# Patient Record
Sex: FEMALE | Race: OTHER | Employment: FULL TIME | URBAN - METROPOLITAN AREA
[De-identification: names, ages, dates, MRNs, and addresses within clinical notes are randomized per-mention and may not be internally consistent; named-entity substitution may affect disease eponyms.]

---

## 2018-05-05 ENCOUNTER — HOSPITAL ENCOUNTER (EMERGENCY)
Age: 53
Discharge: HOME | End: 2018-05-05
Attending: EMERGENCY MEDICINE
Payer: COMMERCIAL

## 2018-05-05 ENCOUNTER — APPOINTMENT (OUTPATIENT)
Dept: GENERAL RADIOLOGY | Age: 53
End: 2018-05-05
Attending: EMERGENCY MEDICINE
Payer: COMMERCIAL

## 2018-05-05 VITALS
DIASTOLIC BLOOD PRESSURE: 83 MMHG | OXYGEN SATURATION: 98 % | TEMPERATURE: 98.6 F | SYSTOLIC BLOOD PRESSURE: 130 MMHG | HEART RATE: 87 BPM | RESPIRATION RATE: 28 BRPM

## 2018-05-05 DIAGNOSIS — J20.9 BRONCHOSPASM WITH BRONCHITIS, ACUTE: Primary | ICD-10-CM

## 2018-05-05 PROCEDURE — 71045 X-RAY EXAM CHEST 1 VIEW: CPT

## 2018-05-05 PROCEDURE — 99284 EMERGENCY DEPT VISIT MOD MDM: CPT | Performed by: EMERGENCY MEDICINE

## 2018-05-05 PROCEDURE — 74011250637 HC RX REV CODE- 250/637: Performed by: EMERGENCY MEDICINE

## 2018-05-05 PROCEDURE — 74011000250 HC RX REV CODE- 250: Performed by: EMERGENCY MEDICINE

## 2018-05-05 PROCEDURE — 74011250636 HC RX REV CODE- 250/636: Performed by: EMERGENCY MEDICINE

## 2018-05-05 PROCEDURE — 77030013140 HC MSK NEB VYRM -A

## 2018-05-05 PROCEDURE — 96374 THER/PROPH/DIAG INJ IV PUSH: CPT | Performed by: EMERGENCY MEDICINE

## 2018-05-05 RX ORDER — BENZONATATE 100 MG/1
200 CAPSULE ORAL
Status: COMPLETED | OUTPATIENT
Start: 2018-05-05 | End: 2018-05-05

## 2018-05-05 RX ORDER — PREDNISONE 20 MG/1
40 TABLET ORAL DAILY
Qty: 10 TAB | Refills: 0 | Status: SHIPPED | OUTPATIENT
Start: 2018-05-05 | End: 2018-05-10

## 2018-05-05 RX ORDER — AZITHROMYCIN 250 MG/1
TABLET, FILM COATED ORAL
Qty: 6 TAB | Refills: 0 | Status: SHIPPED | OUTPATIENT
Start: 2018-05-05 | End: 2018-08-07

## 2018-05-05 RX ORDER — BENZONATATE 100 MG/1
CAPSULE ORAL
Status: DISCONTINUED
Start: 2018-05-05 | End: 2018-05-05 | Stop reason: HOSPADM

## 2018-05-05 RX ORDER — IPRATROPIUM BROMIDE AND ALBUTEROL SULFATE 2.5; .5 MG/3ML; MG/3ML
3 SOLUTION RESPIRATORY (INHALATION)
Status: COMPLETED | OUTPATIENT
Start: 2018-05-05 | End: 2018-05-05

## 2018-05-05 RX ORDER — BENZONATATE 100 MG/1
100 CAPSULE ORAL
Qty: 15 CAP | Refills: 0 | Status: SHIPPED | OUTPATIENT
Start: 2018-05-05 | End: 2018-05-10

## 2018-05-05 RX ORDER — ALBUTEROL SULFATE 90 UG/1
2 AEROSOL, METERED RESPIRATORY (INHALATION)
Qty: 1 INHALER | Refills: 0 | Status: SHIPPED | OUTPATIENT
Start: 2018-05-05 | End: 2019-09-23 | Stop reason: SDUPTHER

## 2018-05-05 RX ADMIN — IPRATROPIUM BROMIDE AND ALBUTEROL SULFATE 3 ML: 2.5; .5 SOLUTION RESPIRATORY (INHALATION) at 00:24

## 2018-05-05 RX ADMIN — METHYLPREDNISOLONE SODIUM SUCCINATE 125 MG: 125 INJECTION, POWDER, FOR SOLUTION INTRAMUSCULAR; INTRAVENOUS at 00:26

## 2018-05-05 RX ADMIN — BENZONATATE 200 MG: 100 CAPSULE ORAL at 01:07

## 2018-05-05 NOTE — LETTER
[facility] EMERGENCY DEPT 
[facility] [address] [place] [id number] 
[phone] Work/School Note Date: 5/5/2018 To Whom It May concern: 
 
Nitin Garcia was seen and treated today in the emergency room by the following provider(s): 
Attending Provider: Jeanmarie Rodney MD. Nitin Garcia may return to work on 5/6/2018.  
 
Sincerely, 
 
 
 
 
Jeanmarie Rodney MD

## 2018-05-05 NOTE — ED NOTES
I have reviewed discharge instructions with the patient. The patient verbalized understanding. Patient left ED via Discharge Method: ambulatory to Home with family. Opportunity for questions and clarification provided. Patient given 4 scripts. To continue your aftercare when you leave the hospital, you may receive an automated call from our care team to check in on how you are doing. This is a free service and part of our promise to provide the best care and service to meet your aftercare needs.  If you have questions, or wish to unsubscribe from this service please call [phone]. Thank you for Choosing our [facility] Emergency Department.

## 2018-05-05 NOTE — ED PROVIDER NOTES
HPI Comments: 82-year-old  female with no medical history has had mild cough and cold symptoms for the past week. Approximately an hour prior to arrival the coughing became much worse and she developed shortness of breath. No fever or chest pain. No vomiting. No tobacco use. no known allergies. Patient is a 50 y.o. female presenting with shortness of breath. The history is provided by the patient. Shortness of Breath   Associated symptoms include cough and wheezing. Pertinent negatives include no fever, no headaches, no neck pain, no chest pain, no vomiting, no abdominal pain and no rash. Past Medical History:   Diagnosis Date    Neurological disorder     migraine       No past surgical history on file. No family history on file. Social History     Social History    Marital status: SINGLE     Spouse name: N/A    Number of children: N/A    Years of education: N/A     Occupational History    Not on file. Social History Main Topics    Smoking status: Never Smoker    Smokeless tobacco: Not on file    Alcohol use No    Drug use: No    Sexual activity: Not on file     Other Topics Concern    Not on file     Social History Narrative    No narrative on file         ALLERGIES: Motrin [ibuprofen]    Review of Systems   Constitutional: Negative for fever. HENT: Positive for congestion. Respiratory: Positive for cough, shortness of breath and wheezing. Cardiovascular: Negative for chest pain. Gastrointestinal: Negative for abdominal pain, nausea and vomiting. Genitourinary: Negative for dysuria. Musculoskeletal: Negative for back pain and neck pain. Skin: Negative for rash. Neurological: Negative for headaches. Vitals:    05/05/18 0022   Pulse: (!) 104   Resp: 28   SpO2: 97%            Physical Exam   Constitutional: She is oriented to person, place, and time. She appears well-developed and well-nourished.    Mild respiratory distress   HENT:   Head: Normocephalic and atraumatic. Mouth/Throat: Oropharynx is clear and moist.   No angioedema   Eyes: Conjunctivae are normal.   Neck: Normal range of motion. Neck supple. No JVD present. Cardiovascular: Regular rhythm. Tachycardia present. Pulmonary/Chest: No stridor. Slight increased respiratory effort with diffuse expiratory wheezes   Abdominal: Soft. She exhibits no distension. There is no tenderness. Musculoskeletal: Normal range of motion. She exhibits no edema. Neurological: She is alert and oriented to person, place, and time. Skin: Skin is warm and dry. Psychiatric: She has a normal mood and affect. Nursing note and vitals reviewed. MDM  Number of Diagnoses or Management Options  Diagnosis management comments: Wheezing has improved with DuoNeb and Solu-Medrol. Patient continued to have cough and was treated with Tessalon with some improvement. Chest x-ray shows mild diffuse haziness of uncertain etiology. No definite infiltrate or heart failure. Symptoms likely viral or seasonal bbut may be related to mycoplasma therefore will treat with Zithromax.   We'll also treat with prednisone, albuterol and Tessalon Perles       Amount and/or Complexity of Data Reviewed  Tests in the radiology section of CPT®: ordered and reviewed  Tests in the medicine section of CPT®: ordered and reviewed    Risk of Complications, Morbidity, and/or Mortality  Presenting problems: low  Diagnostic procedures: low  Management options: low          ED Course       Procedures

## 2018-05-05 NOTE — DISCHARGE INSTRUCTIONS
Bronchitis: Care Instructions  Your Care Instructions    Bronchitis is inflammation of the bronchial tubes, which carry air to the lungs. The tubes swell and produce mucus, or phlegm. The mucus and inflamed bronchial tubes make you cough. You may have trouble breathing. Most cases of bronchitis are caused by viruses like those that cause colds. Antibiotics usually do not help and they may be harmful. Bronchitis usually develops rapidly and lasts about 2 to 3 weeks in otherwise healthy people. Follow-up care is a key part of your treatment and safety. Be sure to make and go to all appointments, and call your doctor if you are having problems. It's also a good idea to know your test results and keep a list of the medicines you take. How can you care for yourself at home? · Take all medicines exactly as prescribed. Call your doctor if you think you are having a problem with your medicine. · Get some extra rest.  · Take an over-the-counter pain medicine, such as acetaminophen (Tylenol), ibuprofen (Advil, Motrin), or naproxen (Aleve) to reduce fever and relieve body aches. Read and follow all instructions on the label. · Do not take two or more pain medicines at the same time unless the doctor told you to. Many pain medicines have acetaminophen, which is Tylenol. Too much acetaminophen (Tylenol) can be harmful. · Take an over-the-counter cough medicine that contains dextromethorphan to help quiet a dry, hacking cough so that you can sleep. Avoid cough medicines that have more than one active ingredient. Read and follow all instructions on the label. · Breathe moist air from a humidifier, hot shower, or sink filled with hot water. The heat and moisture will thin mucus so you can cough it out. · Do not smoke. Smoking can make bronchitis worse. If you need help quitting, talk to your doctor about stop-smoking programs and medicines. These can increase your chances of quitting for good.   When should you call for help? Call 911 anytime you think you may need emergency care. For example, call if:  ? · You have severe trouble breathing. ?Call your doctor now or seek immediate medical care if:  ? · You have new or worse trouble breathing. ? · You cough up dark brown or bloody mucus (sputum). ? · You have a new or higher fever. ? · You have a new rash. ? Watch closely for changes in your health, and be sure to contact your doctor if:  ? · You cough more deeply or more often, especially if you notice more mucus or a change in the color of your mucus. ? · You are not getting better as expected. Where can you learn more? Go to http://jesus-hossein.info/. Enter H333 in the search box to learn more about \"Bronchitis: Care Instructions. \"  Current as of: May 12, 2017  Content Version: 11.4  © 1329-2236 [employer]. Care instructions adapted under license by [employer] (which disclaims liability or warranty for this information). If you have questions about a medical condition or this instruction, always ask your healthcare professional. [place] [address] any warranty or liability for your use of this information. Wheezing or Bronchoconstriction: Care Instructions  Your Care Instructions  Wheezing is a whistling noise made during breathing. It occurs when the small airways, or bronchial tubes, that lead to your lungs swell or contract (spasm) and become narrow. This narrowing is called bronchoconstriction. When your airways constrict, it is hard for air to pass through and this makes it hard for you to breathe. Wheezing and bronchoconstriction can be caused by many problems, including:  · An infection such as the flu or a cold. · Allergies such as hay fever. · Diseases such as asthma or chronic obstructive pulmonary disease. · Smoking. Treatment for your wheezing depends on what is causing the problem.  Your wheezing may get better without treatment. But you may need to pay attention to things that cause your wheezing and avoid them. Or you may need medicine to help treat the wheezing and to reduce the swelling or to relieve spasms in your lungs. Follow-up care is a key part of your treatment and safety. Be sure to make and go to all appointments, and call your doctor if you are having problems. It is also a good idea to know your test results and keep a list of the medicines you take. How can you care for yourself at home? · Take your medicine exactly as prescribed. Call your doctor if you think you are having a problem with your medicine. You will get more details on the specific medicine your doctor prescribes. · If your doctor prescribed antibiotics, take them as directed. Do not stop taking them just because you feel better. You need to take the full course of antibiotics. · Breathe moist air from a humidifier, hot shower, or sink filled with hot water. This may help ease your symptoms and make it easier for you to breathe. · If you have congestion in your nose and throat, drinking plenty of fluids, especially hot fluids, may help relieve your symptoms. If you have kidney, heart, or liver disease and have to limit fluids, talk with your doctor before you increase the amount of fluids you drink. · If you have mucus in your airways, it may help to breathe deeply and cough. · Do not smoke or allow others to smoke around you. Smoking can make your wheezing worse. If you need help quitting, talk to your doctor about stop-smoking programs and medicines. These can increase your chances of quitting for good. · Avoid things that may cause your wheezing. These may include colds, smoke, air pollution, dust, pollen, pets, cockroaches, stress, and cold air. When should you call for help? Call 911 anytime you think you may need emergency care. For example, call if:  ? · You have severe trouble breathing. ? · You passed out (lost consciousness). ?Call your doctor now or seek immediate medical care if:  ? · You cough up yellow, dark brown, or bloody mucus (sputum). ? · You have new or worse shortness of breath. ? · Your wheezing is not getting better or it gets worse after you start taking your medicine. ? Watch closely for changes in your health, and be sure to contact your doctor if:  ? · You do not get better as expected. Where can you learn more? Go to http://jesus-hossein.info/. Enter 454 6408 in the search box to learn more about \"Wheezing or Bronchoconstriction: Care Instructions. \"  Current as of: May 12, 2017  Content Version: 11.4  © 0718-3642 [employer]. Care instructions adapted under license by [employer] (which disclaims liability or warranty for this information). If you have questions about a medical condition or this instruction, always ask your healthcare professional. [address] any warranty or liability for your use of this information.

## 2018-08-07 ENCOUNTER — HOSPITAL ENCOUNTER (OUTPATIENT)
Dept: GENERAL RADIOLOGY | Age: 53
Discharge: HOME | End: 2018-08-07
Attending: FAMILY MEDICINE
Payer: COMMERCIAL

## 2018-08-07 DIAGNOSIS — M79.671 RIGHT FOOT PAIN: ICD-10-CM

## 2018-08-07 PROCEDURE — 73630 X-RAY EXAM OF FOOT: CPT

## 2018-08-07 NOTE — PROGRESS NOTES
Dear Ms. Lindsey Carmen recent XR showed:   IMPRESSION:  1. Mild spurring at the midfoot. 2. Calcaneal spur.       Follow up with Ortho and Orthotics as consulted.     Thanks,  Dr. Michelle Calzada

## 2018-12-13 ENCOUNTER — HOSPITAL ENCOUNTER (OUTPATIENT)
Dept: PHYSICAL THERAPY | Age: 53
Discharge: HOME | End: 2018-12-13
Attending: FAMILY MEDICINE
Payer: COMMERCIAL

## 2018-12-13 DIAGNOSIS — M25.551 RIGHT HIP PAIN: ICD-10-CM

## 2018-12-13 DIAGNOSIS — M62.830 BACK MUSCLE SPASM: ICD-10-CM

## 2018-12-13 PROCEDURE — 97110 THERAPEUTIC EXERCISES: CPT

## 2018-12-13 PROCEDURE — 97161 PT EVAL LOW COMPLEX 20 MIN: CPT

## 2018-12-13 NOTE — THERAPY EVALUATION
Davina Argueta  : 1965  Primary: Chema Schmitt*  Secondary:  [facility] [address] [place] [address], [address], [address]  [phone]   [phone]          OUTPATIENT PHYSICAL THERAPY:Initial Assessment 2018   ICD-10: Treatment Diagnosis: Low back pain (M54.5); Right hip pain (M25.551)  Precautions/Allergies:   Motrin [ibuprofen]   MD Orders: evaluate and treat MEDICAL/REFERRING DIAGNOSIS:  Right hip pain [M25.551]  Back muscle spasm [M62.830]   DATE OF ONSET: Chronic   REFERRING PHYSICIAN: Clint Torres MD  RETURN PHYSICIAN APPOINTMENT: unknown      INITIAL ASSESSMENT:  Ms. Nino Geiger presents in therapy today with chronic reports of R hip and back pain, following a fall. She will benefit from skilled PT in order to improve safe and functional mobility. PROBLEM LIST (Impacting functional limitations):  1. Decreased Strength  2. Decreased ADL/Functional Activities  3. Decreased Ambulation Ability/Technique  4. Increased Pain  5. Decreased Activity Tolerance  6. Decreased Flexibility/Joint Mobility  7. Decreased Knowledge of Precautions  8. Decreased [place] with Home Exercise Program INTERVENTIONS PLANNED: (Treatment may consist of any combination of the following)  1. Cold  2. Cryotherapy  3. Electrical Stimulation  4. Gait Training  5. Heat  6. Home Exercise Program (HEP)  7. Manual Therapy  8. Neuromuscular Re-education/Strengthening  9. Range of Motion (ROM)  10. Therapeutic Activites  11. Therapeutic Exercise/Strengthening   TREATMENT PLAN:  Effective Dates: 2018 TO 2019 (60 days). Frequency/Duration: 2 times a week for 60 Day(s)  GOALS: (Goals have been discussed and agreed upon with patient.)    SHORT-TERM FUNCTIONAL GOALS: Time Frame: 4 weeks  1. Patient will be compliant with HEP focused on lower extremity strengthening and ROM.    2.  Patient will rate R LE pain no greater than 7/10 for improved tolerance to daily and work duties. DISCHARGE GOALS: Time Frame: 8 weeks  1. Patient will be independent with comprehensive HEP focused on continued performance of lower extremity strengthening and ROM activities. 2.  Patient will rate R LE pain no greater than 4/10 and which does not significantly interfere with daily or work duties for return to previous level of function. 3.  Patient will demonstrate near symmetrical bilateral LE AROM for optimum functional mobility with daily and work duties. 4.  Patient will demonstrate near symmetrical bilateral LE strength grades in the above tested planes for optimum performance and safety with daily and work duties. Rehabilitation Potential For Stated Goals: Good  Regarding [facility] therapy, I certify that the treatment plan above will be carried out by a therapist or under their direction. Thank you for this referral,  Jaimee Villarreal PT, DPT     Referring Physician Signature: Viktoriya Gutierrez MD              Date                    The information in this section was collected on 12-13-18 (except where otherwise noted). HISTORY:   History of Present Injury/Illness (Reason for Referral):  Patient reports several weeks ago, she fell and injured her R hip and back. She states the pain has slowly improved, but is still significantly impacting her walking and ADLs/work. She is currently out of work, and must submit for FMLA or return to work on 1-7-19. She reports medication has not improved her pain. Past Medical History/Comorbidities:   Ms. Sonal Saenz  has a past medical history of Neurological disorder. Ms. Sonal Saenz  has a past surgical history that includes hx bartholin cyst marsupialization. Social History/Living Environment:     Independent   Prior Level of Function/Work/Activity:   - [employer] - prolonged standing and twisting reported for work duties    Personal Factors:          Sex:  female        Age:  48 y.o.         Other factors that influence how disability is experienced by the patient: LEFS functional score is 0/80, which seems exaggerated based on patient's presentation and observed abilities. Ambulatory/Rehab Services H2 Model Falls Risk Assessment    Risk Factors:       No Risk Factors Identified Ability to Rise from Chair:       (1)  Pushes up, successful in one attempt    Falls Prevention Plan:       No modifications necessary   Total: (5 or greater = High Risk): 1    ©2010 [facility] [place]. All Rights Reserved. [facility] [employer] Patent #[id number]. Federal Law prohibits the replication, distribution or use without written permission from [facility] [email or web address]     Current Medications:       Current Outpatient Medications:     tiZANidine (ZANAFLEX) 2 mg tablet, Take 1 Tab by mouth three (3) times daily as needed. , Disp: 30 Tab, Rfl: 1    traMADol (ULTRAM) 50 mg tablet, Take 1 Tab by mouth three (3) times daily as needed for Pain. Max Daily Amount: 150 mg., Disp: 30 Tab, Rfl: 1    diclofenac potassium (CATAFLAM) 50 mg tablet, Take 1 Tab by mouth three (3) times daily as needed. As needed for pain, Disp: 30 Tab, Rfl: 1    aspirin-acetaminophen-caffeine (EXCEDRIN ES) 250-250-65 mg per tablet, Take 1 Tab by mouth., Disp: , Rfl:     albuterol (PROVENTIL HFA, VENTOLIN HFA, PROAIR HFA) 90 mcg/actuation inhaler, Take 2 Puffs by inhalation every four (4) hours as needed for Wheezing (with spacer). , Disp: 1 Inhaler, Rfl: 0   Date Last Reviewed:  12/19/2018     Number of Personal Factors/Comorbidities that affect the Plan of Care: 1-2: MODERATE COMPLEXITY   EXAMINATION:   Observation/Orthostatic Postural Assessment:          Patient rises very slowly and cautiously from seated position, with visible difficulty and pain. She pushes through UEs to stand and ambulates with decreased weight bearing through RLE, very slow gait speed and pronounced antalgic gait.    Palpation:          Tenderness at R greater trochanter and general hip region and lower back  ROM:          R hip passive flexion 100, abduction 35, ER and IR at hip are 20 degrees; standing lumbar AROM: flexion 50%, extension 25%, side-bend and rotation bilaterally are 50%  Strength:          R hip flexion 3-/5, extension 3-/5, abduction 3-/5, ER and IR 3-/5   Body Structures Involved:  1. Nerves  2. Bones  3. Joints  4. Muscles Body Functions Affected:  1. Sensory/Pain  2. Neuromusculoskeletal  3. Movement Related Activities and Participation Affected:  1. General Tasks and Demands  2. Mobility  3. Self Care  4. Domestic Life  5. Interpersonal Interactions and Relationships  6. Community, Social and [place]   Number of elements (examined above) that affect the Plan of Care: 3: MODERATE COMPLEXITY   CLINICAL PRESENTATION:   Presentation: Stable and uncomplicated: LOW COMPLEXITY   CLINICAL DECISION MAKING:   Outcome Measure: Tool Used: Lower Extremity Functional Scale (LEFS)  Score:  Initial: 0/80 Most Recent: X/80 (Date: -- )   Interpretation of Score: 20 questions each scored on a 5 point scale with 0 representing \"extreme difficulty or unable to perform\" and 4 representing \"no difficulty\". The lower the score, the greater the functional disability. 80/80 represents no disability. Minimal detectable change is 9 points. Score 80 79-63 62-48 47-32 31-16 15-1 0   Modifier CH CI CJ CK CL CM CN       Medical Necessity:   · Patient is expected to demonstrate progress in strength and range of motion to improve safety during functional mobility. Reason for Services/Other Comments:  · Patient continues to require skilled intervention due to not reaching long term goals.    Use of outcome tool(s) and clinical judgement create a POC that gives a: Clear prediction of patient's progress: LOW COMPLEXITY            TREATMENT:   (In addition to Assessment/Re-Assessment sessions the following treatments were rendered)  Pre-treatment Symptoms/Complaints:  Patient reports high levels of R hip and back pain, rated as 9/10 at worst. She states sitting down/resting will alleviate pain, while \"everything\" increases pain. She has difficulty sleeping due to pain. She is currently out of work because of her pain, at least until 1-7-18 when her FMLA is due. Pain: Initial:     8/10 Post Session:  6-7/10     THERAPEUTIC EXERCISE: (10 minutes):  Exercises per grid below to improve mobility and strength. Required minimal visual and verbal cues to promote proper body alignment, promote proper body posture and promote proper body mechanics. Progressed complexity of movement as indicated. Date:  12-13-18 Date:   Date:     Activity/Exercise Parameters Parameters Parameters   Seated hamstring stretch 5x20 sec       Hamstring curls x10                                         MANUAL THERAPY: (0 minutes): Joint mobilization and Soft tissue mobilization was utilized and necessary because of the patient's restricted joint motion, painful spasm and restricted motion of soft tissue. MODALITIES: (5 minutes): *  Hot Pack Therapy in order to provide analgesia and relieve muscle spasm. Treatment/Session Assessment:    · Response to Treatment:  Patient verbalized understanding of plan of care. · Compliance with Program/Exercises: Will assess as treatment progresses. · Recommendations/Intent for next treatment session: \"Next visit will focus on advancements to more challenging activities\". Total Treatment Duration:  PT Patient Time In/Time Out  Time In: 1345  Time Out: [address], PT, DPT    No future appointments.

## 2018-12-17 ENCOUNTER — HOSPITAL ENCOUNTER (OUTPATIENT)
Dept: PHYSICAL THERAPY | Age: 53
Discharge: HOME | End: 2018-12-17
Attending: FAMILY MEDICINE
Payer: COMMERCIAL

## 2018-12-17 PROCEDURE — 97140 MANUAL THERAPY 1/> REGIONS: CPT

## 2018-12-17 PROCEDURE — 97110 THERAPEUTIC EXERCISES: CPT

## 2018-12-19 ENCOUNTER — HOSPITAL ENCOUNTER (OUTPATIENT)
Dept: PHYSICAL THERAPY | Age: 53
Discharge: HOME | End: 2018-12-19
Attending: FAMILY MEDICINE
Payer: COMMERCIAL

## 2018-12-19 PROCEDURE — 97140 MANUAL THERAPY 1/> REGIONS: CPT

## 2018-12-19 PROCEDURE — 97014 ELECTRIC STIMULATION THERAPY: CPT

## 2018-12-19 PROCEDURE — 97110 THERAPEUTIC EXERCISES: CPT

## 2018-12-19 NOTE — PROGRESS NOTES
Jaimee Reddy  : 1965  Primary: [facility]  Secondary:  [facility] [place] [address], [address], [place] [address]  [phone]   [phone]          OUTPATIENT PHYSICAL THERAPY:Daily Note 2018   ICD-10: Treatment Diagnosis: Low back pain (M54.5); Right hip pain (M25.551)  Precautions/Allergies:   Motrin [ibuprofen]   MD Orders: evaluate and treat MEDICAL/REFERRING DIAGNOSIS:  Pain in right hip [M25.551]  Muscle spasm of back [M62.830]   DATE OF ONSET: Chronic   REFERRING PHYSICIAN: Schuyler Gaytan MD  RETURN PHYSICIAN APPOINTMENT: unknown      INITIAL ASSESSMENT:  Ms. Napoleon Jaime presents in therapy today with chronic reports of R hip and back pain, following a fall. She will benefit from skilled PT in order to improve safe and functional mobility. PROBLEM LIST (Impacting functional limitations):  1. Decreased Strength  2. Decreased ADL/Functional Activities  3. Decreased Ambulation Ability/Technique  4. Increased Pain  5. Decreased Activity Tolerance  6. Decreased Flexibility/Joint Mobility  7. Decreased Knowledge of Precautions  8. Decreased [place] with Home Exercise Program INTERVENTIONS PLANNED: (Treatment may consist of any combination of the following)  1. Cold  2. Cryotherapy  3. Electrical Stimulation  4. Gait Training  5. Heat  6. Home Exercise Program (HEP)  7. Manual Therapy  8. Neuromuscular Re-education/Strengthening  9. Range of Motion (ROM)  10. Therapeutic Activites  11. Therapeutic Exercise/Strengthening   TREATMENT PLAN:  Effective Dates: 2018 TO 2019 (60 days). Frequency/Duration: 2 times a week for 60 Day(s)  GOALS: (Goals have been discussed and agreed upon with patient.)    SHORT-TERM FUNCTIONAL GOALS: Time Frame: 4 weeks  1. Patient will be compliant with HEP focused on lower extremity strengthening and ROM.    2.  Patient will rate R LE pain no greater than 7/10 for improved tolerance to daily and work duties. DISCHARGE GOALS: Time Frame: 8 weeks  1. Patient will be independent with comprehensive HEP focused on continued performance of lower extremity strengthening and ROM activities. 2.  Patient will rate R LE pain no greater than 4/10 and which does not significantly interfere with daily or work duties for return to previous level of function. 3.  Patient will demonstrate near symmetrical bilateral LE AROM for optimum functional mobility with daily and work duties. 4.  Patient will demonstrate near symmetrical bilateral LE strength grades in the above tested planes for optimum performance and safety with daily and work duties. Rehabilitation Potential For Stated Goals: Good  Regarding Rae Jefferson's therapy, I certify that the treatment plan above will be carried out by a therapist or under their direction. Thank you for this referral,  Tae Dorman, PT, DPT     Referring Physician Signature: Haley Randolph MD              Date                    The information in this section was collected on 12-13-18 (except where otherwise noted). HISTORY:   History of Present Injury/Illness (Reason for Referral):  Patient reports several weeks ago, she fell and injured her R hip and back. She states the pain has slowly improved, but is still significantly impacting her walking and ADLs/work. She is currently out of work, and must submit for FMLA or return to work on 1-7-19. She reports medication has not improved her pain. Past Medical History/Comorbidities:   Ms. Abram Mon  has a past medical history of Neurological disorder. Ms. Abram Mon  has a past surgical history that includes hx bartholin cyst marsupialization. Social History/Living Environment:     Independent   Prior Level of Function/Work/Activity:   - Sandy Husbands - prolonged standing and twisting reported for work duties    Personal Factors:          Sex:  female        Age:  48 y.o.         Other factors that influence how disability is experienced by the patient: LEFS functional score is 0/80, which seems exaggerated based on patient's presentation and observed abilities. Ambulatory/Rehab Services H2 Model Falls Risk Assessment    Risk Factors:       No Risk Factors Identified Ability to Rise from Chair:       (1)  Pushes up, successful in one attempt    Falls Prevention Plan:       No modifications necessary   Total: (5 or greater = High Risk): 1    ©2010 [facility] [place]. All Rights Reserved. [facility] [employer] Patent #[id number]. Federal Law prohibits the replication, distribution or use without written permission from [facility] [employer]     Current Medications:       Current Outpatient Medications:     tiZANidine (ZANAFLEX) 2 mg tablet, Take 1 Tab by mouth three (3) times daily as needed. , Disp: 30 Tab, Rfl: 1    traMADol (ULTRAM) 50 mg tablet, Take 1 Tab by mouth three (3) times daily as needed for Pain. Max Daily Amount: 150 mg., Disp: 30 Tab, Rfl: 1    diclofenac potassium (CATAFLAM) 50 mg tablet, Take 1 Tab by mouth three (3) times daily as needed. As needed for pain, Disp: 30 Tab, Rfl: 1    aspirin-acetaminophen-caffeine (EXCEDRIN ES) 250-250-65 mg per tablet, Take 1 Tab by mouth., Disp: , Rfl:     albuterol (PROVENTIL HFA, VENTOLIN HFA, PROAIR HFA) 90 mcg/actuation inhaler, Take 2 Puffs by inhalation every four (4) hours as needed for Wheezing (with spacer). , Disp: 1 Inhaler, Rfl: 0   Date Last Reviewed:  12/19/2018     Number of Personal Factors/Comorbidities that affect the Plan of Care: 1-2: MODERATE COMPLEXITY   EXAMINATION:   Observation/Orthostatic Postural Assessment:          Patient rises very slowly and cautiously from seated position, with visible difficulty and pain. She pushes through UEs to stand and ambulates with decreased weight bearing through RLE, very slow gait speed and pronounced antalgic gait.    Palpation:          Tenderness at R greater trochanter and general hip region and lower back  ROM:          R hip passive flexion 100, abduction 35, ER and IR at hip are 20 degrees; standing lumbar AROM: flexion 50%, extension 25%, side-bend and rotation bilaterally are 50%  Strength:          R hip flexion 3-/5, extension 3-/5, abduction 3-/5, ER and IR 3-/5   Body Structures Involved:  1. Nerves  2. Bones  3. Joints  4. Muscles Body Functions Affected:  1. Sensory/Pain  2. Neuromusculoskeletal  3. Movement Related Activities and Participation Affected:  1. General Tasks and Demands  2. Mobility  3. Self Care  4. Domestic Life  5. Interpersonal Interactions and Relationships  6. Community, Social and [place]   Number of elements (examined above) that affect the Plan of Care: 3: MODERATE COMPLEXITY   CLINICAL PRESENTATION:   Presentation: Stable and uncomplicated: LOW COMPLEXITY   CLINICAL DECISION MAKING:   Outcome Measure: Tool Used: Lower Extremity Functional Scale (LEFS)  Score:  Initial: 0/80 Most Recent: X/80 (Date: -- )   Interpretation of Score: 20 questions each scored on a 5 point scale with 0 representing \"extreme difficulty or unable to perform\" and 4 representing \"no difficulty\". The lower the score, the greater the functional disability. 80/80 represents no disability. Minimal detectable change is 9 points. Score 80 79-63 62-48 47-32 31-16 15-1 0   Modifier CH CI CJ CK CL CM CN       Medical Necessity:   · Patient is expected to demonstrate progress in strength and range of motion to improve safety during functional mobility. Reason for Services/Other Comments:  · Patient continues to require skilled intervention due to not reaching long term goals.    Use of outcome tool(s) and clinical judgement create a POC that gives a: Clear prediction of patient's progress: LOW COMPLEXITY            TREATMENT:   (In addition to Assessment/Re-Assessment sessions the following treatments were rendered)  Pre-treatment Symptoms/Complaints:  Patient reports she is feeling better today, and is ambulating with improved speed and quality. She states her lower back is in more pain today. Pain: Initial:     5/10 Post Session:  4/10     THERAPEUTIC EXERCISE: (15 minutes):  Exercises per grid below to improve mobility and strength. Required minimal visual and verbal cues to promote proper body alignment, promote proper body posture and promote proper body mechanics. Progressed complexity of movement as indicated. Date:  12-13-18 Date:  12-17-18 Date:  12-19-18   Activity/Exercise Parameters Parameters Parameters   Stepper    10 minutes  Level 1 10 minutes  Level 1   Seated hamstring stretch 5x20 sec    5x30 sec   Hamstring curls x10                                         MANUAL THERAPY: (15 minutes): Joint mobilization and Soft tissue mobilization was utilized and necessary because of the patient's restricted joint motion, painful spasm and restricted motion of soft tissue. Patient received passive R hip rotator/piriformis stretching, 5x30 seconds, patient supine; patient received passive SLR, 5x30 seconds each; Patient received R hip long axis distraction, 10 second holds x 10     MODALITIES: (15 minutes):  Patient received IFC e-stim to R sided lower lumbar region, patient prone, H-wave with high frequency, intensity set to patient tolerance       Treatment/Session Assessment:    · Response to Treatment:  Patient responded very well to e-stim today, noting decreased pain in the lower back at end of session. She is pleased with her current progress. · Compliance with Program/Exercises: Will assess as treatment progresses. · Recommendations/Intent for next treatment session: \"Next visit will focus on advancements to more challenging activities\".   Total Treatment Duration:  PT Patient Time In/Time Out  Time In: 1115  Time Out: Yaritza Araya, PT, DPT    Future Appointments   Date Time Provider Elkin Almeida   12/20/2018 11:15 AM Vee Rivers, PT, DPT [facility] 12/26/2018  5:00 PM Fozia Mcfadden PT, DPT [facility]   12/27/2018  1:45 PM [facility]   12/31/2018  1:00 PM Fozia Mcfadden PT, DPT [facility]   1/2/2019  1:30 PM Fozia Mcfadden PT, DPT [facility]   1/3/2019  1:00 PM Fozia Mcfadden PT, DPT [facility]

## 2018-12-19 NOTE — PROGRESS NOTES
Caitlin Senior  : 1965  [facility]  Secondary:  [facility] [place] [address], [address], [place] [address]  [phone]   [phone]          OUTPATIENT PHYSICAL THERAPY:Daily Note 2018   ICD-10: Treatment Diagnosis: Low back pain (M54.5); Right hip pain (M25.551)  Precautions/Allergies:   Motrin [ibuprofen]   MD Orders: evaluate and treat MEDICAL/REFERRING DIAGNOSIS:  Pain in right hip [M25.551]  Muscle spasm of back [M62.830]   DATE OF ONSET: Chronic   REFERRING PHYSICIAN: Bernadette Low MD  RETURN PHYSICIAN APPOINTMENT: unknown      INITIAL ASSESSMENT:  Ms. Jonny Dykes presents in therapy today with chronic reports of R hip and back pain, following a fall. She will benefit from skilled PT in order to improve safe and functional mobility. PROBLEM LIST (Impacting functional limitations):  1. Decreased Strength  2. Decreased ADL/Functional Activities  3. Decreased Ambulation Ability/Technique  4. Increased Pain  5. Decreased Activity Tolerance  6. Decreased Flexibility/Joint Mobility  7. Decreased Knowledge of Precautions  8. Decreased [place] with Home Exercise Program INTERVENTIONS PLANNED: (Treatment may consist of any combination of the following)  1. Cold  2. Cryotherapy  3. Electrical Stimulation  4. Gait Training  5. Heat  6. Home Exercise Program (HEP)  7. Manual Therapy  8. Neuromuscular Re-education/Strengthening  9. Range of Motion (ROM)  10. Therapeutic Activites  11. Therapeutic Exercise/Strengthening   TREATMENT PLAN:  Effective Dates: 2018 TO 2019 (60 days). Frequency/Duration: 2 times a week for 60 Day(s)  GOALS: (Goals have been discussed and agreed upon with patient.)    SHORT-TERM FUNCTIONAL GOALS: Time Frame: 4 weeks  1. Patient will be compliant with HEP focused on lower extremity strengthening and ROM.    2.  Patient will rate R LE pain no greater than 7/10 for improved tolerance to daily and work duties. DISCHARGE GOALS: Time Frame: 8 weeks  1. Patient will be independent with comprehensive HEP focused on continued performance of lower extremity strengthening and ROM activities. 2.  Patient will rate R LE pain no greater than 4/10 and which does not significantly interfere with daily or work duties for return to previous level of function. 3.  Patient will demonstrate near symmetrical bilateral LE AROM for optimum functional mobility with daily and work duties. 4.  Patient will demonstrate near symmetrical bilateral LE strength grades in the above tested planes for optimum performance and safety with daily and work duties. Rehabilitation Potential For Stated Goals: Good  Regarding Mandy Jefferson's therapy, I certify that the treatment plan above will be carried out by a therapist or under their direction. Thank you for this referral,  Patricia Albright, PT, DPT     Referring Physician Signature: Petty Burroughs MD              Date                    The information in this section was collected on 12-13-18 (except where otherwise noted). HISTORY:   History of Present Injury/Illness (Reason for Referral):  Patient reports several weeks ago, she fell and injured her R hip and back. She states the pain has slowly improved, but is still significantly impacting her walking and ADLs/work. She is currently out of work, and must submit for FMLA or return to work on 1-7-19. She reports medication has not improved her pain. Past Medical History/Comorbidities:   Ms. Marcella Clifford  has a past medical history of Neurological disorder. Ms. Marcella Clifford  has a past surgical history that includes hx bartholin cyst marsupialization. Social History/Living Environment:     Independent   Prior Level of Function/Work/Activity:   - Alicia Schmitt - prolonged standing and twisting reported for work duties    Personal Factors:          Sex:  female        Age:  48 y.o.         Other factors that influence how disability is experienced by the patient: LEFS functional score is 0/80, which seems exaggerated based on patient's presentation and observed abilities. Ambulatory/Rehab Services H2 Model Falls Risk Assessment    Risk Factors:       No Risk Factors Identified Ability to Rise from Chair:       (1)  Pushes up, successful in one attempt    Falls Prevention Plan:       No modifications necessary   Total: (5 or greater = High Risk): 1    ©2010 [facility] [place] [employer]. All Rights Reserved. [facility] [employer] Patent #[id number]. Federal Law prohibits the replication, distribution or use without written permission from [facility] [employer]     Current Medications:       Current Outpatient Medications:     tiZANidine (ZANAFLEX) 2 mg tablet, Take 1 Tab by mouth three (3) times daily as needed. , Disp: 30 Tab, Rfl: 1    traMADol (ULTRAM) 50 mg tablet, Take 1 Tab by mouth three (3) times daily as needed for Pain. Max Daily Amount: 150 mg., Disp: 30 Tab, Rfl: 1    diclofenac potassium (CATAFLAM) 50 mg tablet, Take 1 Tab by mouth three (3) times daily as needed. As needed for pain, Disp: 30 Tab, Rfl: 1    aspirin-acetaminophen-caffeine (EXCEDRIN ES) 250-250-65 mg per tablet, Take 1 Tab by mouth., Disp: , Rfl:     albuterol (PROVENTIL HFA, VENTOLIN HFA, PROAIR HFA) 90 mcg/actuation inhaler, Take 2 Puffs by inhalation every four (4) hours as needed for Wheezing (with spacer). , Disp: 1 Inhaler, Rfl: 0   Date Last Reviewed:  12/19/2018     Number of Personal Factors/Comorbidities that affect the Plan of Care: 1-2: MODERATE COMPLEXITY   EXAMINATION:   Observation/Orthostatic Postural Assessment:          Patient rises very slowly and cautiously from seated position, with visible difficulty and pain. She pushes through UEs to stand and ambulates with decreased weight bearing through RLE, very slow gait speed and pronounced antalgic gait.    Palpation:          Tenderness at R greater trochanter and general hip region and lower back  ROM:          R hip passive flexion 100, abduction 35, ER and IR at hip are 20 degrees; standing lumbar AROM: flexion 50%, extension 25%, side-bend and rotation bilaterally are 50%  Strength:          R hip flexion 3-/5, extension 3-/5, abduction 3-/5, ER and IR 3-/5   Body Structures Involved:  1. Nerves  2. Bones  3. Joints  4. Muscles Body Functions Affected:  1. Sensory/Pain  2. Neuromusculoskeletal  3. Movement Related Activities and Participation Affected:  1. General Tasks and Demands  2. Mobility  3. Self Care  4. Domestic Life  5. Interpersonal Interactions and Relationships  6. Community, Social and [place]   Number of elements (examined above) that affect the Plan of Care: 3: MODERATE COMPLEXITY   CLINICAL PRESENTATION:   Presentation: Stable and uncomplicated: LOW COMPLEXITY   CLINICAL DECISION MAKING:   Outcome Measure: Tool Used: Lower Extremity Functional Scale (LEFS)  Score:  Initial: 0/80 Most Recent: X/80 (Date: -- )   Interpretation of Score: 20 questions each scored on a 5 point scale with 0 representing \"extreme difficulty or unable to perform\" and 4 representing \"no difficulty\". The lower the score, the greater the functional disability. 80/80 represents no disability. Minimal detectable change is 9 points. Score 80 79-63 62-48 47-32 31-16 15-1 0   Modifier CH CI CJ CK CL CM CN       Medical Necessity:   · Patient is expected to demonstrate progress in strength and range of motion to improve safety during functional mobility. Reason for Services/Other Comments:  · Patient continues to require skilled intervention due to not reaching long term goals.    Use of outcome tool(s) and clinical judgement create a POC that gives a: Clear prediction of patient's progress: LOW COMPLEXITY            TREATMENT:   (In addition to Assessment/Re-Assessment sessions the following treatments were rendered)  Pre-treatment Symptoms/Complaints:  Patient reports she is doing OK today, R hip pain is there and rated as 6/10  Pain: Initial:     6/10 Post Session:  4/10     THERAPEUTIC EXERCISE: (10 minutes):  Exercises per grid below to improve mobility and strength. Required minimal visual and verbal cues to promote proper body alignment, promote proper body posture and promote proper body mechanics. Progressed complexity of movement as indicated. Date:  12-13-18 Date:  12-17-18 Date:     Activity/Exercise Parameters Parameters Parameters   Stepper    10 minutes  Level 1    Seated hamstring stretch 5x20 sec       Hamstring curls x10                                         MANUAL THERAPY: (30 minutes): Joint mobilization and Soft tissue mobilization was utilized and necessary because of the patient's restricted joint motion, painful spasm and restricted motion of soft tissue. Patient received passive R hip rotator/piriformis stretching, 5x30 seconds, patient supine; patient received passive SLR, 5x30 seconds each; Patient received R hip long axis distraction, 10 second holds x 10     MODALITIES: (5 minutes): *  Hot Pack Therapy in order to provide analgesia and relieve muscle spasm. Treatment/Session Assessment:    · Response to Treatment:  Patient did well with introduction of treatment session, focused heavily on manual therapy and ROM. · Compliance with Program/Exercises: Will assess as treatment progresses. · Recommendations/Intent for next treatment session: \"Next visit will focus on advancements to more challenging activities\".   Total Treatment Duration:  PT Patient Time In/Time Out  Time In: 1600  Time Out: Sherwin Gomez PT, DPT    Future Appointments   Date Time Provider Elkin Almeida   12/20/2018 11:15 AM Adenike Martinez PT, DPT [facility]   12/26/2018  5:00 PM Adenike Martinez PT, DPT [facility]   12/27/2018  1:45 PM Adenike Martinez PT, DPT [facility]   12/31/2018  1:00 PM Adenike Martinez PT, DPT [facility]   1/2/2019  1:30 PM Faith Cadena, DPT [facility]   1/3/2019  1:00 PM Fannie Teixeira PT, DPT [facility]

## 2018-12-20 ENCOUNTER — HOSPITAL ENCOUNTER (OUTPATIENT)
Dept: PHYSICAL THERAPY | Age: 53
Discharge: HOME | End: 2018-12-20
Attending: FAMILY MEDICINE
Payer: COMMERCIAL

## 2018-12-20 PROCEDURE — 97014 ELECTRIC STIMULATION THERAPY: CPT

## 2018-12-20 PROCEDURE — 97140 MANUAL THERAPY 1/> REGIONS: CPT

## 2018-12-20 PROCEDURE — 97110 THERAPEUTIC EXERCISES: CPT

## 2018-12-20 NOTE — PROGRESS NOTES
Bobo Gleason  : 1965  Primary: [facility]*  Secondary:  [facility] [place] [place] [address], [address], [address] [address]  [phone]   [phone]          OUTPATIENT PHYSICAL THERAPY:Daily Note 2018   ICD-10: Treatment Diagnosis: Low back pain (M54.5); Right hip pain (M25.551)  Precautions/Allergies:   Motrin [ibuprofen]   MD Orders: evaluate and treat MEDICAL/REFERRING DIAGNOSIS:  Pain in right hip [M25.551]  Muscle spasm of back [M62.830]   DATE OF ONSET: Chronic   REFERRING PHYSICIAN: Gail Alfaro MD  RETURN PHYSICIAN APPOINTMENT: unknown      INITIAL ASSESSMENT:  Ms. Zeferino Oliveros presents in therapy today with chronic reports of R hip and back pain, following a fall. She will benefit from skilled PT in order to improve safe and functional mobility. PROBLEM LIST (Impacting functional limitations):  1. Decreased Strength  2. Decreased ADL/Functional Activities  3. Decreased Ambulation Ability/Technique  4. Increased Pain  5. Decreased Activity Tolerance  6. Decreased Flexibility/Joint Mobility  7. Decreased Knowledge of Precautions  8. Decreased [place] with Home Exercise Program INTERVENTIONS PLANNED: (Treatment may consist of any combination of the following)  1. Cold  2. Cryotherapy  3. Electrical Stimulation  4. Gait Training  5. Heat  6. Home Exercise Program (HEP)  7. Manual Therapy  8. Neuromuscular Re-education/Strengthening  9. Range of Motion (ROM)  10. Therapeutic Activites  11. Therapeutic Exercise/Strengthening   TREATMENT PLAN:  Effective Dates: 2018 TO 2019 (60 days). Frequency/Duration: 2 times a week for 60 Day(s)  GOALS: (Goals have been discussed and agreed upon with patient.)    SHORT-TERM FUNCTIONAL GOALS: Time Frame: 4 weeks  1. Patient will be compliant with HEP focused on lower extremity strengthening and ROM.    2.  Patient will rate R LE pain no greater than 7/10 for improved tolerance to daily and work duties. DISCHARGE GOALS: Time Frame: 8 weeks  1. Patient will be independent with comprehensive HEP focused on continued performance of lower extremity strengthening and ROM activities. 2.  Patient will rate R LE pain no greater than 4/10 and which does not significantly interfere with daily or work duties for return to previous level of function. 3.  Patient will demonstrate near symmetrical bilateral LE AROM for optimum functional mobility with daily and work duties. 4.  Patient will demonstrate near symmetrical bilateral LE strength grades in the above tested planes for optimum performance and safety with daily and work duties. Rehabilitation Potential For Stated Goals: Good  Regarding Yesenia Jefferson's therapy, I certify that the treatment plan above will be carried out by a therapist or under their direction. Thank you for this referral,  Yelitza Mcbride, PT, DPT     Referring Physician Signature: Guille Leong MD              Date                    The information in this section was collected on 12-13-18 (except where otherwise noted). HISTORY:   History of Present Injury/Illness (Reason for Referral):  Patient reports several weeks ago, she fell and injured her R hip and back. She states the pain has slowly improved, but is still significantly impacting her walking and ADLs/work. She is currently out of work, and must submit for FMLA or return to work on 1-7-19. She reports medication has not improved her pain. Past Medical History/Comorbidities:   Ms. Jake Hines  has a past medical history of Neurological disorder. Ms. Jake Hines  has a past surgical history that includes hx bartholin cyst marsupialization. Social History/Living Environment:     Independent   Prior Level of Function/Work/Activity:   - Suzanne Vicente - prolonged standing and twisting reported for work duties    Personal Factors:          Sex:  female        Age:  48 y.o.         Other factors that influence how disability is experienced by the patient: LEFS functional score is 0/80, which seems exaggerated based on patient's presentation and observed abilities. Ambulatory/Rehab Services H2 Model Falls Risk Assessment    Risk Factors:       No Risk Factors Identified Ability to Rise from Chair:       (1)  Pushes up, successful in one attempt    Falls Prevention Plan:       No modifications necessary   Total: (5 or greater = High Risk): 1    ©2010 [facility] of [facility]. All Rights Reserved. [facility] [employer] Patent #[id number]. Federal Law prohibits the replication, distribution or use without written permission from [facility] of [address] [place] [address]     Current Medications:       Current Outpatient Medications:     tiZANidine (ZANAFLEX) 2 mg tablet, Take 1 Tab by mouth three (3) times daily as needed. , Disp: 30 Tab, Rfl: 1    traMADol (ULTRAM) 50 mg tablet, Take 1 Tab by mouth three (3) times daily as needed for Pain. Max Daily Amount: 150 mg., Disp: 30 Tab, Rfl: 1    diclofenac potassium (CATAFLAM) 50 mg tablet, Take 1 Tab by mouth three (3) times daily as needed. As needed for pain, Disp: 30 Tab, Rfl: 1    aspirin-acetaminophen-caffeine (EXCEDRIN ES) 250-250-65 mg per tablet, Take 1 Tab by mouth., Disp: , Rfl:     albuterol (PROVENTIL HFA, VENTOLIN HFA, PROAIR HFA) 90 mcg/actuation inhaler, Take 2 Puffs by inhalation every four (4) hours as needed for Wheezing (with spacer). , Disp: 1 Inhaler, Rfl: 0   Date Last Reviewed:  12/20/2018     Number of Personal Factors/Comorbidities that affect the Plan of Care: 1-2: MODERATE COMPLEXITY   EXAMINATION:   Observation/Orthostatic Postural Assessment:          Patient rises very slowly and cautiously from seated position, with visible difficulty and pain. She pushes through UEs to stand and ambulates with decreased weight bearing through RLE, very slow gait speed and pronounced antalgic gait.    Palpation:          Tenderness at R greater trochanter and general hip region and lower back  ROM:          R hip passive flexion 100, abduction 35, ER and IR at hip are 20 degrees; standing lumbar AROM: flexion 50%, extension 25%, side-bend and rotation bilaterally are 50%  Strength:          R hip flexion 3-/5, extension 3-/5, abduction 3-/5, ER and IR 3-/5   Body Structures Involved:  1. Nerves  2. Bones  3. Joints  4. Muscles Body Functions Affected:  1. Sensory/Pain  2. Neuromusculoskeletal  3. Movement Related Activities and Participation Affected:  1. General Tasks and Demands  2. Mobility  3. Self Care  4. Domestic Life  5. Interpersonal Interactions and Relationships  6. Community, Social and [place]   Number of elements (examined above) that affect the Plan of Care: 3: MODERATE COMPLEXITY   CLINICAL PRESENTATION:   Presentation: Stable and uncomplicated: LOW COMPLEXITY   CLINICAL DECISION MAKING:   Outcome Measure: Tool Used: Lower Extremity Functional Scale (LEFS)  Score:  Initial: 0/80 Most Recent: X/80 (Date: -- )   Interpretation of Score: 20 questions each scored on a 5 point scale with 0 representing \"extreme difficulty or unable to perform\" and 4 representing \"no difficulty\". The lower the score, the greater the functional disability. 80/80 represents no disability. Minimal detectable change is 9 points. Score 80 79-63 62-48 47-32 31-16 15-1 0   Modifier CH CI CJ CK CL CM CN       Medical Necessity:   · Patient is expected to demonstrate progress in strength and range of motion to improve safety during functional mobility. Reason for Services/Other Comments:  · Patient continues to require skilled intervention due to not reaching long term goals.    Use of outcome tool(s) and clinical judgement create a POC that gives a: Clear prediction of patient's progress: LOW COMPLEXITY            TREATMENT:   (In addition to Assessment/Re-Assessment sessions the following treatments were rendered)  Pre-treatment Symptoms/Complaints:  Patient reports the electrical stimulation greatly helped her pain and still walking much better. Pain: Initial:     5/10 Post Session:  4/10     THERAPEUTIC EXERCISE: (15 minutes):  Exercises per grid below to improve mobility and strength. Required minimal visual and verbal cues to promote proper body alignment, promote proper body posture and promote proper body mechanics. Progressed complexity of movement as indicated. Date:  12-13-18 Date:  12-17-18 Date:  12-19-18 Date  12-20-18   Activity/Exercise Parameters Parameters Parameters    Stepper    10 minutes  Level 1 10 minutes  Level 1 10 minutes  Level 1   Seated hamstring stretch 5x20 sec    5x30 sec 5x30 sec   Hamstring curls x10                                               MANUAL THERAPY: (15 minutes): Joint mobilization and Soft tissue mobilization was utilized and necessary because of the patient's restricted joint motion, painful spasm and restricted motion of soft tissue. Patient received passive R hip rotator/piriformis stretching, 5x30 seconds, patient supine; patient received passive SLR, 5x30 seconds each; Patient received R hip long axis distraction, 10 second holds x 10     MODALITIES: (15 minutes):  Patient received IFC e-stim to R sided lower lumbar region, patient prone, H-wave with high frequency, intensity set to patient tolerance       Treatment/Session Assessment:    · Response to Treatment:  Patient responded very well to e-stim today, noting decreased pain in the lower back at end of session. She is pleased with her current progress. · Compliance with Program/Exercises: Will assess as treatment progresses. · Recommendations/Intent for next treatment session: \"Next visit will focus on advancements to more challenging activities\".   Total Treatment Duration:  PT Patient Time In/Time Out  Time In: 1115  Time Out: Yaritza Araya, PT, DPT    Future Appointments   Date Time Provider Elkin Almeida   12/26/2018  5:00 PM Richa Herr, PT, DPT [facility] 12/27/2018  1:45 PM [facility]   12/31/2018  1:00 PM Hyun Villalta PT, DPT [facility]   1/2/2019  1:30 PM [facility]   1/3/2019  1:00 PM Hyun Villalta PT, DPT [facility]

## 2018-12-26 ENCOUNTER — HOSPITAL ENCOUNTER (OUTPATIENT)
Dept: PHYSICAL THERAPY | Age: 53
Discharge: HOME | End: 2018-12-26
Attending: FAMILY MEDICINE
Payer: COMMERCIAL

## 2018-12-26 PROCEDURE — 97014 ELECTRIC STIMULATION THERAPY: CPT

## 2018-12-26 PROCEDURE — 97110 THERAPEUTIC EXERCISES: CPT

## 2018-12-26 NOTE — PROGRESS NOTES
Mame Escoto  : 1965  Primary: [facility]*  Secondary:  [facility] [place] [address], [address], [place]  [phone]   [phone]          OUTPATIENT PHYSICAL THERAPY:Daily Note 2018   ICD-10: Treatment Diagnosis: Low back pain (M54.5); Right hip pain (M25.551)  Precautions/Allergies:   Motrin [ibuprofen]   MD Orders: evaluate and treat MEDICAL/REFERRING DIAGNOSIS:  Pain in right hip [M25.551]  Muscle spasm of back [M62.830]   DATE OF ONSET: Chronic   REFERRING PHYSICIAN: Haley Randolph MD  RETURN PHYSICIAN APPOINTMENT: unknown      INITIAL ASSESSMENT:  Ms. Abram Mon presents in therapy today with chronic reports of R hip and back pain, following a fall. She will benefit from skilled PT in order to improve safe and functional mobility. PROBLEM LIST (Impacting functional limitations):  1. Decreased Strength  2. Decreased ADL/Functional Activities  3. Decreased Ambulation Ability/Technique  4. Increased Pain  5. Decreased Activity Tolerance  6. Decreased Flexibility/Joint Mobility  7. Decreased Knowledge of Precautions  8. Decreased [place] with Home Exercise Program INTERVENTIONS PLANNED: (Treatment may consist of any combination of the following)  1. Cold  2. Cryotherapy  3. Electrical Stimulation  4. Gait Training  5. Heat  6. Home Exercise Program (HEP)  7. Manual Therapy  8. Neuromuscular Re-education/Strengthening  9. Range of Motion (ROM)  10. Therapeutic Activites  11. Therapeutic Exercise/Strengthening   TREATMENT PLAN:  Effective Dates: 2018 TO 2019 (60 days). Frequency/Duration: 2 times a week for 60 Day(s)  GOALS: (Goals have been discussed and agreed upon with patient.)    SHORT-TERM FUNCTIONAL GOALS: Time Frame: 4 weeks  1. Patient will be compliant with HEP focused on lower extremity strengthening and ROM.    2.  Patient will rate R LE pain no greater than 7/10 for improved tolerance to daily and work duties. DISCHARGE GOALS: Time Frame: 8 weeks  1. Patient will be independent with comprehensive HEP focused on continued performance of lower extremity strengthening and ROM activities. 2.  Patient will rate R LE pain no greater than 4/10 and which does not significantly interfere with daily or work duties for return to previous level of function. 3.  Patient will demonstrate near symmetrical bilateral LE AROM for optimum functional mobility with daily and work duties. 4.  Patient will demonstrate near symmetrical bilateral LE strength grades in the above tested planes for optimum performance and safety with daily and work duties. Rehabilitation Potential For Stated Goals: Good  Regarding Bianka Jefferson's therapy, I certify that the treatment plan above will be carried out by a therapist or under their direction. Thank you for this referral,  Maite Rosales, PT, DPT     Referring Physician Signature: Lis Aguirre MD              Date                    The information in this section was collected on 12-13-18 (except where otherwise noted). HISTORY:   History of Present Injury/Illness (Reason for Referral):  Patient reports several weeks ago, she fell and injured her R hip and back. She states the pain has slowly improved, but is still significantly impacting her walking and ADLs/work. She is currently out of work, and must submit for FMLA or return to work on 1-7-19. She reports medication has not improved her pain. Past Medical History/Comorbidities:   Ms. Adilson Flores  has a past medical history of Neurological disorder. Ms. Adilson Flores  has a past surgical history that includes hx bartholin cyst marsupialization. Social History/Living Environment:     Independent   Prior Level of Function/Work/Activity:   - Mahamed Echeverria - prolonged standing and twisting reported for work duties    Personal Factors:          Sex:  female        Age:  48 y.o.         Other factors that influence how disability is experienced by the patient: LEFS functional score is 0/80, which seems exaggerated based on patient's presentation and observed abilities. Ambulatory/Rehab Services H2 Model Falls Risk Assessment    Risk Factors:       No Risk Factors Identified Ability to Rise from Chair:       (1)  Pushes up, successful in one attempt    Falls Prevention Plan:       No modifications necessary   Total: (5 or greater = High Risk): 1    ©2010 [facility] of [employer]. All Rights Reserved. [facility] [employer] Patent #[id number]. Federal Law prohibits the replication, distribution or use without written permission from [facility] [employer]     Current Medications:       Current Outpatient Medications:     tiZANidine (ZANAFLEX) 2 mg tablet, Take 1 Tab by mouth three (3) times daily as needed. , Disp: 30 Tab, Rfl: 1    traMADol (ULTRAM) 50 mg tablet, Take 1 Tab by mouth three (3) times daily as needed for Pain. Max Daily Amount: 150 mg., Disp: 30 Tab, Rfl: 1    diclofenac potassium (CATAFLAM) 50 mg tablet, Take 1 Tab by mouth three (3) times daily as needed. As needed for pain, Disp: 30 Tab, Rfl: 1    aspirin-acetaminophen-caffeine (EXCEDRIN ES) 250-250-65 mg per tablet, Take 1 Tab by mouth., Disp: , Rfl:     albuterol (PROVENTIL HFA, VENTOLIN HFA, PROAIR HFA) 90 mcg/actuation inhaler, Take 2 Puffs by inhalation every four (4) hours as needed for Wheezing (with spacer). , Disp: 1 Inhaler, Rfl: 0   Date Last Reviewed:  12/26/2018     Number of Personal Factors/Comorbidities that affect the Plan of Care: 1-2: MODERATE COMPLEXITY   EXAMINATION:   Observation/Orthostatic Postural Assessment:          Patient rises very slowly and cautiously from seated position, with visible difficulty and pain. She pushes through UEs to stand and ambulates with decreased weight bearing through RLE, very slow gait speed and pronounced antalgic gait.    Palpation:          Tenderness at R greater trochanter and general hip region and lower back  ROM:          R hip passive flexion 100, abduction 35, ER and IR at hip are 20 degrees; standing lumbar AROM: flexion 50%, extension 25%, side-bend and rotation bilaterally are 50%  Strength:          R hip flexion 3-/5, extension 3-/5, abduction 3-/5, ER and IR 3-/5   Body Structures Involved:  1. Nerves  2. Bones  3. Joints  4. Muscles Body Functions Affected:  1. Sensory/Pain  2. Neuromusculoskeletal  3. Movement Related Activities and Participation Affected:  1. General Tasks and Demands  2. Mobility  3. Self Care  4. Domestic Life  5. Interpersonal Interactions and Relationships  6. Community, Social and [place]   Number of elements (examined above) that affect the Plan of Care: 3: MODERATE COMPLEXITY   CLINICAL PRESENTATION:   Presentation: Stable and uncomplicated: LOW COMPLEXITY   CLINICAL DECISION MAKING:   Outcome Measure: Tool Used: Lower Extremity Functional Scale (LEFS)  Score:  Initial: 0/80 Most Recent: X/80 (Date: -- )   Interpretation of Score: 20 questions each scored on a 5 point scale with 0 representing \"extreme difficulty or unable to perform\" and 4 representing \"no difficulty\". The lower the score, the greater the functional disability. 80/80 represents no disability. Minimal detectable change is 9 points. Score 80 79-63 62-48 47-32 31-16 15-1 0   Modifier CH CI CJ CK CL CM CN       Medical Necessity:   · Patient is expected to demonstrate progress in strength and range of motion to improve safety during functional mobility. Reason for Services/Other Comments:  · Patient continues to require skilled intervention due to not reaching long term goals.    Use of outcome tool(s) and clinical judgement create a POC that gives a: Clear prediction of patient's progress: LOW COMPLEXITY            TREATMENT:   (In addition to Assessment/Re-Assessment sessions the following treatments were rendered)  Pre-treatment Symptoms/Complaints:  Patient reports she has minimal pain when walking, so she knows she is progressing well. Pain: Initial:     4/10 Post Session:  3/10     THERAPEUTIC EXERCISE: (30 minutes):  Exercises per grid below to improve mobility and strength. Required minimal visual and verbal cues to promote proper body alignment, promote proper body posture and promote proper body mechanics. Progressed complexity of movement as indicated. Date:  12-13-18 Date:  12-17-18 Date:  12-19-18 Date  12-20-18 Date  12-26-18   Activity/Exercise Parameters Parameters Parameters     Stepper    10 minutes  Level 1 10 minutes  Level 1 10 minutes  Level 1 10 minutes  Level 1   Seated hamstring stretch 5x20 sec    5x30 sec 5x30 sec 5x30 sec   Hamstring curls x10         Standing lumbar extension       x20  Over // bar   Lower trunk rotation        x15   Bridging        x10                       MANUAL THERAPY: (0 minutes): Joint mobilization and Soft tissue mobilization was utilized and necessary because of the patient's restricted joint motion, painful spasm and restricted motion of soft tissue. Patient received passive R hip rotator/piriformis stretching, 5x30 seconds, patient supine; patient received passive SLR, 5x30 seconds each; Patient received R hip long axis distraction, 10 second holds x 10     MODALITIES: (15 minutes):  Patient received IFC e-stim to R sided lower lumbar region, patient prone, H-wave with high frequency, intensity set to patient tolerance       Treatment/Session Assessment:    · Response to Treatment:  Patient noted some discomfort and difficulty with bridging today. Overall, she appears to be progressing very well, and she is pleased with her progression in therapy. · Compliance with Program/Exercises: Will assess as treatment progresses. · Recommendations/Intent for next treatment session: \"Next visit will focus on advancements to more challenging activities\".   Total Treatment Duration:  PT Patient Time In/Time Out  Time In: 1600  Time Out: 1645    Bobby She Lin PT, DPT    Future Appointments   Date Time Provider Elkin Elle   12/27/2018  1:30 PM [facility]   12/27/2018  1:45 PM Gurpreet Morrissey PT, DPT [facility]   12/31/2018  1:00 PM Gurpreet Morrissey PT, DPT [facility]   1/2/2019  1:30 PM Gurpreet Morrissey PT, DPT [facility]   1/3/2019  1:00 PM Gurpreet Morrissey PT, DPT [facility]

## 2018-12-27 ENCOUNTER — APPOINTMENT (OUTPATIENT)
Dept: PHYSICAL THERAPY | Age: 53
End: 2018-12-27
Attending: FAMILY MEDICINE
Payer: COMMERCIAL

## 2018-12-31 ENCOUNTER — HOSPITAL ENCOUNTER (OUTPATIENT)
Dept: PHYSICAL THERAPY | Age: 53
Discharge: HOME | End: 2018-12-31
Attending: FAMILY MEDICINE
Payer: COMMERCIAL

## 2018-12-31 PROCEDURE — 97110 THERAPEUTIC EXERCISES: CPT

## 2018-12-31 NOTE — PROGRESS NOTES
Christel Dyson : 1965 Primary: [facility]* Secondary:  Therapy Center at [facility] [address], [address], [place] [address] [phone]   [phone] OUTPATIENT PHYSICAL THERAPY:Daily Note 2018 ICD-10: Treatment Diagnosis: Low back pain (M54.5); Right hip pain (M25.551) Precautions/Allergies: Motrin [ibuprofen] MD Orders: evaluate and treat MEDICAL/REFERRING DIAGNOSIS: 
Pain in right hip [M25.551] Muscle spasm of back [M62.830] DATE OF ONSET: Chronic REFERRING PHYSICIAN: Tu Chen MD 
RETURN PHYSICIAN APPOINTMENT: unknown INITIAL ASSESSMENT:  Ms. Alli Chin presents in therapy today with chronic reports of R hip and back pain, following a fall. She will benefit from skilled PT in order to improve safe and functional mobility. PROBLEM LIST (Impacting functional limitations): 1. Decreased Strength 2. Decreased ADL/Functional Activities 3. Decreased Ambulation Ability/Technique 4. Increased Pain 5. Decreased Activity Tolerance 6. Decreased Flexibility/Joint Mobility 7. Decreased Knowledge of Precautions 8. Decreased [place] with Home Exercise Program INTERVENTIONS PLANNED: (Treatment may consist of any combination of the following) 1. Cold 2. Cryotherapy 3. Electrical Stimulation 4. Gait Training 5. Heat 6. Home Exercise Program (HEP) 7. Manual Therapy 8. Neuromuscular Re-education/Strengthening 9. Range of Motion (ROM) 10. Therapeutic Activites 11. Therapeutic Exercise/Strengthening TREATMENT PLAN: 
Effective Dates: 2018 TO 2019 (60 days). Frequency/Duration: 2 times a week for 60 Day(s) GOALS: (Goals have been discussed and agreed upon with patient.) SHORT-TERM FUNCTIONAL GOALS: Time Frame: 4 weeks 1. Patient will be compliant with HEP focused on lower extremity strengthening and ROM.   
2.  Patient will rate R LE pain no greater than 7/10 for improved tolerance to daily and work duties. DISCHARGE GOALS: Time Frame: 8 weeks 1. Patient will be independent with comprehensive HEP focused on continued performance of lower extremity strengthening and ROM activities. 2.  Patient will rate R LE pain no greater than 4/10 and which does not significantly interfere with daily or work duties for return to previous level of function. 3.  Patient will demonstrate near symmetrical bilateral LE AROM for optimum functional mobility with daily and work duties. 4.  Patient will demonstrate near symmetrical bilateral LE strength grades in the above tested planes for optimum performance and safety with daily and work duties. Rehabilitation Potential For Stated Goals: Good Regarding Robert Jefferson's therapy, I certify that the treatment plan above will be carried out by a therapist or under their direction. Thank you for this referral, Garry Pickard PT, DPT Referring Physician Signature: Alyssa Thorpe MD            Date The information in this section was collected on 12-13-18 (except where otherwise noted). HISTORY:  
History of Present Injury/Illness (Reason for Referral): 
Patient reports several weeks ago, she fell and injured her R hip and back. She states the pain has slowly improved, but is still significantly impacting her walking and ADLs/work. She is currently out of work, and must submit for FMLA or return to work on 1-7-19. She reports medication has not improved her pain. Past Medical History/Comorbidities: Ms. Gretchen Cheney  has a past medical history of Neurological disorder. Ms. Gretchen Cheney  has a past surgical history that includes hx bartholin cyst marsupialization. Social History/Living Environment:  
  Independent Prior Level of Function/Work/Activity: 
 - Paramjit Simmons - prolonged standing and twisting reported for work duties Personal Factors:   
      Sex:  female Age:  48 y.o. Other factors that influence how disability is experienced by the patient: LEFS functional score is 0/80, which seems exaggerated based on patient's presentation and observed abilities. Ambulatory/Rehab Services H2 Model Falls Risk Assessment Risk Factors: 
     No Risk Factors Identified Ability to Rise from Chair: 
     (1)  Pushes up, successful in one attempt Falls Prevention Plan: No modifications necessary Total: (5 or greater = High Risk): 1 ©2010 [facility] . [facility] Patent #[id number]. Federal Law prohibits the replication, distribution or use without written permission from [facility] [employer] Current Medications:   
  
Current Outpatient Medications:  
  tiZANidine (ZANAFLEX) 2 mg tablet, Take 1 Tab by mouth three (3) times daily as needed. , Disp: 30 Tab, Rfl: 1 
  traMADol (ULTRAM) 50 mg tablet, Take 1 Tab by mouth three (3) times daily as needed for Pain. Max Daily Amount: 150 mg., Disp: 30 Tab, Rfl: 1 
  diclofenac potassium (CATAFLAM) 50 mg tablet, Take 1 Tab by mouth three (3) times daily as needed. As needed for pain, Disp: 30 Tab, Rfl: 1 
  aspirin-acetaminophen-caffeine (EXCEDRIN ES) 250-250-65 mg per tablet, Take 1 Tab by mouth., Disp: , Rfl:  
  albuterol (PROVENTIL HFA, VENTOLIN HFA, PROAIR HFA) 90 mcg/actuation inhaler, Take 2 Puffs by inhalation every four (4) hours as needed for Wheezing (with spacer). , Disp: 1 Inhaler, Rfl: 0 Date Last Reviewed:  12/31/2018 Number of Personal Factors/Comorbidities that affect the Plan of Care: 1-2: MODERATE COMPLEXITY EXAMINATION:  
Observation/Orthostatic Postural Assessment:   
      Patient rises very slowly and cautiously from seated position, with visible difficulty and pain. She pushes through UEs to stand and ambulates with decreased weight bearing through RLE, very slow gait speed and pronounced antalgic gait. Palpation: Tenderness at R greater trochanter and general hip region and lower back ROM:   
      R hip passive flexion 100, abduction 35, ER and IR at hip are 20 degrees; standing lumbar AROM: flexion 50%, extension 25%, side-bend and rotation bilaterally are 50% Strength:   
      R hip flexion 3-/5, extension 3-/5, abduction 3-/5, ER and IR 3-/5 Body Structures Involved: 1. Nerves 2. Bones 3. Joints 4. Muscles Body Functions Affected: 1. Sensory/Pain 2. Neuromusculoskeletal 
3. Movement Related Activities and Participation Affected: 1. General Tasks and Demands 2. Mobility 3. Self Care 4. Domestic Life 5. Interpersonal Interactions and Relationships 6. Community, Social and [place] Number of elements (examined above) that affect the Plan of Care: 3: MODERATE COMPLEXITY CLINICAL PRESENTATION:  
Presentation: Stable and uncomplicated: LOW COMPLEXITY CLINICAL DECISION MAKING:  
Outcome Measure: Tool Used: Lower Extremity Functional Scale (LEFS) Score:  Initial: 0/80 Most Recent: X/80 (Date: -- ) Interpretation of Score: 20 questions each scored on a 5 point scale with 0 representing \"extreme difficulty or unable to perform\" and 4 representing \"no difficulty\". The lower the score, the greater the functional disability. 80/80 represents no disability. Minimal detectable change is 9 points. Score 80 79-63 62-48 47-32 31-16 15-1 0 Modifier CH CI CJ CK CL CM CN Medical Necessity:  
· Patient is expected to demonstrate progress in strength and range of motion to improve safety during functional mobility. Reason for Services/Other Comments: 
· Patient continues to require skilled intervention due to not reaching long term goals. Use of outcome tool(s) and clinical judgement create a POC that gives a: Clear prediction of patient's progress: LOW COMPLEXITY  
  
 
 
 
TREATMENT:  
(In addition to Assessment/Re-Assessment sessions the following treatments were rendered) Pre-treatment Symptoms/Complaints:  Patient reports she is doing very well, and confident in returning to work soon. Pain: Initial:  
  4/10 Post Session:  3/10 THERAPEUTIC EXERCISE: (40 minutes):  Exercises per grid below to improve mobility and strength. Required minimal visual and verbal cues to promote proper body alignment, promote proper body posture and promote proper body mechanics. Progressed complexity of movement as indicated. Date: 
12-13-18 Date: 
12-17-18 Date: 
12-19-18 Date 12-20-18 Date 12-26-18 Date 12-31-18 Activity/Exercise Parameters Parameters Parameters Stepper 10 minutes Level 1 10 minutes Level 1 10 minutes Level 1 10 minutes Level 1 10 minutes Level 1 Seated hamstring stretch 5x20 sec 5x30 sec 5x30 sec 5x30 sec Hamstring curls x10 Standing lumbar extension x20 Over // bar x20 Over // bar Lower trunk rotation x15 x15 Bridging  
     x10 x15 Side step 6 inch step 
// bars 2x10 ea Isometric hip flexion 3 sec hold 
x10 ea Supine Mini squat 
      // bars 
x20 MANUAL THERAPY: (0 minutes): Joint mobilization and Soft tissue mobilization was utilized and necessary because of the patient's restricted joint motion, painful spasm and restricted motion of soft tissue. Patient received passive R hip rotator/piriformis stretching, 5x30 seconds, patient supine; patient received passive SLR, 5x30 seconds each; Patient received R hip long axis distraction, 10 second holds x 10 MODALITIES: (10 minutes):  Patient received IFC e-stim to R sided lower lumbar region, patient prone, H-wave with high frequency, intensity set to patient tolerance Treatment/Session Assessment:   
· Response to Treatment:  Patient is progressing very well and is pleased with her current state.  She states she believes she is physically ready for return to work, and recognizes that her first days back may increase her symptoms. · Compliance with Program/Exercises: Will assess as treatment progresses. · Recommendations/Intent for next treatment session: \"Next visit will focus on advancements to more challenging activities\". Total Treatment Duration: PT Patient Time In/Time Out Time In: 1300 Time Out: 54[address] [place], PT, DPT Future Appointments Date Time Provider Elkin Almeida 1/2/2019  1:30 PM Marlen Colon PT, DPT [facility]  
1/3/2019  1:00 PM Marlen Colon PT, DPT [facility]  
1/4/2019 11:15 AM Esteban Arias MD [facility] [place]

## 2019-01-02 ENCOUNTER — HOSPITAL ENCOUNTER (OUTPATIENT)
Dept: PHYSICAL THERAPY | Age: 54
Discharge: HOME | End: 2019-01-02
Attending: FAMILY MEDICINE
Payer: COMMERCIAL

## 2019-01-02 PROCEDURE — 97110 THERAPEUTIC EXERCISES: CPT

## 2019-01-02 PROCEDURE — 97014 ELECTRIC STIMULATION THERAPY: CPT

## 2019-01-02 NOTE — PROGRESS NOTES
Eleni Rios : 1965 Primary: [facility]* Secondary:  Therapy Center at [address] [place] [address], [address], [place] [address] [phone]   [phone] OUTPATIENT PHYSICAL THERAPY:Daily Note 2019 ICD-10: Treatment Diagnosis: Low back pain (M54.5); Right hip pain (M25.551) Precautions/Allergies: Motrin [ibuprofen] MD Orders: evaluate and treat MEDICAL/REFERRING DIAGNOSIS: 
Pain in right hip [M25.551] Muscle spasm of back [M62.830] DATE OF ONSET: Chronic REFERRING PHYSICIAN: Trudy Ricci MD 
RETURN PHYSICIAN APPOINTMENT: unknown INITIAL ASSESSMENT:  Ms. Trinity Peters presents in therapy today with chronic reports of R hip and back pain, following a fall. She will benefit from skilled PT in order to improve safe and functional mobility. PROBLEM LIST (Impacting functional limitations): 1. Decreased Strength 2. Decreased ADL/Functional Activities 3. Decreased Ambulation Ability/Technique 4. Increased Pain 5. Decreased Activity Tolerance 6. Decreased Flexibility/Joint Mobility 7. Decreased Knowledge of Precautions 8. Decreased [place] with Home Exercise Program INTERVENTIONS PLANNED: (Treatment may consist of any combination of the following) 1. Cold 2. Cryotherapy 3. Electrical Stimulation 4. Gait Training 5. Heat 6. Home Exercise Program (HEP) 7. Manual Therapy 8. Neuromuscular Re-education/Strengthening 9. Range of Motion (ROM) 10. Therapeutic Activites 11. Therapeutic Exercise/Strengthening TREATMENT PLAN: 
Effective Dates: 2018 TO 2019 (60 days). Frequency/Duration: 2 times a week for 60 Day(s) GOALS: (Goals have been discussed and agreed upon with patient.) SHORT-TERM FUNCTIONAL GOALS: Time Frame: 4 weeks 1. Patient will be compliant with HEP focused on lower extremity strengthening and ROM.   
2.  Patient will rate R LE pain no greater than 7/10 for improved tolerance to daily and work duties. DISCHARGE GOALS: Time Frame: 8 weeks 1. Patient will be independent with comprehensive HEP focused on continued performance of lower extremity strengthening and ROM activities. 2.  Patient will rate R LE pain no greater than 4/10 and which does not significantly interfere with daily or work duties for return to previous level of function. 3.  Patient will demonstrate near symmetrical bilateral LE AROM for optimum functional mobility with daily and work duties. 4.  Patient will demonstrate near symmetrical bilateral LE strength grades in the above tested planes for optimum performance and safety with daily and work duties. Rehabilitation Potential For Stated Goals: Good Regarding Natasha Jefferson's therapy, I certify that the treatment plan above will be carried out by a therapist or under their direction. Thank you for this referral, Jessy Bull, PT, DPT Referring Physician Signature: Connie Fishman MD            Date The information in this section was collected on 12-13-18 (except where otherwise noted). HISTORY:  
History of Present Injury/Illness (Reason for Referral): 
Patient reports several weeks ago, she fell and injured her R hip and back. She states the pain has slowly improved, but is still significantly impacting her walking and ADLs/work. She is currently out of work, and must submit for FMLA or return to work on 1-7-19. She reports medication has not improved her pain. Past Medical History/Comorbidities: Ms. Audi Garcia  has a past medical history of Neurological disorder. Ms. Audi Garcia  has a past surgical history that includes hx bartholin cyst marsupialization. Social History/Living Environment:  
  Independent Prior Level of Function/Work/Activity: 
 - Vesta Susie - prolonged standing and twisting reported for work duties Personal Factors:   
      Sex:  female Age:  48 y.o. Other factors that influence how disability is experienced by the patient: LEFS functional score is 0/80, which seems exaggerated based on patient's presentation and observed abilities. Ambulatory/Rehab Services H2 Model Falls Risk Assessment Risk Factors: 
     No Risk Factors Identified Ability to Rise from Chair: 
     (1)  Pushes up, successful in one attempt Falls Prevention Plan: No modifications necessary Total: (5 or greater = High Risk): 1 ©2010 [facility] . [facility] Patent #[id number]. Federal Law prohibits the replication, distribution or use without written permission from [facility] [employer] Current Medications:   
  
Current Outpatient Medications:  
  tiZANidine (ZANAFLEX) 2 mg tablet, Take 1 Tab by mouth three (3) times daily as needed. , Disp: 30 Tab, Rfl: 1 
  traMADol (ULTRAM) 50 mg tablet, Take 1 Tab by mouth three (3) times daily as needed for Pain. Max Daily Amount: 150 mg., Disp: 30 Tab, Rfl: 1 
  diclofenac potassium (CATAFLAM) 50 mg tablet, Take 1 Tab by mouth three (3) times daily as needed. As needed for pain, Disp: 30 Tab, Rfl: 1 
  aspirin-acetaminophen-caffeine (EXCEDRIN ES) 250-250-65 mg per tablet, Take 1 Tab by mouth., Disp: , Rfl:  
  albuterol (PROVENTIL HFA, VENTOLIN HFA, PROAIR HFA) 90 mcg/actuation inhaler, Take 2 Puffs by inhalation every four (4) hours as needed for Wheezing (with spacer). , Disp: 1 Inhaler, Rfl: 0 Date Last Reviewed:  1/2/2019 Number of Personal Factors/Comorbidities that affect the Plan of Care: 1-2: MODERATE COMPLEXITY EXAMINATION:  
Observation/Orthostatic Postural Assessment:   
      Patient rises very slowly and cautiously from seated position, with visible difficulty and pain. She pushes through UEs to stand and ambulates with decreased weight bearing through RLE, very slow gait speed and pronounced antalgic gait. Palpation: Tenderness at R greater trochanter and general hip region and lower back ROM:   
      R hip passive flexion 100, abduction 35, ER and IR at hip are 20 degrees; standing lumbar AROM: flexion 50%, extension 25%, side-bend and rotation bilaterally are 50% Strength:   
      R hip flexion 3-/5, extension 3-/5, abduction 3-/5, ER and IR 3-/5 Body Structures Involved: 1. Nerves 2. Bones 3. Joints 4. Muscles Body Functions Affected: 1. Sensory/Pain 2. Neuromusculoskeletal 
3. Movement Related Activities and Participation Affected: 1. General Tasks and Demands 2. Mobility 3. Self Care 4. Domestic Life 5. Interpersonal Interactions and Relationships 6. Community, Social and [place] Number of elements (examined above) that affect the Plan of Care: 3: MODERATE COMPLEXITY CLINICAL PRESENTATION:  
Presentation: Stable and uncomplicated: LOW COMPLEXITY CLINICAL DECISION MAKING:  
Outcome Measure: Tool Used: Lower Extremity Functional Scale (LEFS) Score:  Initial: 0/80 Most Recent: X/80 (Date: -- ) Interpretation of Score: 20 questions each scored on a 5 point scale with 0 representing \"extreme difficulty or unable to perform\" and 4 representing \"no difficulty\". The lower the score, the greater the functional disability. 80/80 represents no disability. Minimal detectable change is 9 points. Score 80 79-63 62-48 47-32 31-16 15-1 0 Modifier CH CI CJ CK CL CM CN Medical Necessity:  
· Patient is expected to demonstrate progress in strength and range of motion to improve safety during functional mobility. Reason for Services/Other Comments: 
· Patient continues to require skilled intervention due to not reaching long term goals. Use of outcome tool(s) and clinical judgement create a POC that gives a: Clear prediction of patient's progress: LOW COMPLEXITY  
  
 
 
 
TREATMENT:  
(In addition to Assessment/Re-Assessment sessions the following treatments were rendered) Pre-treatment Symptoms/Complaints:  Patient reports she is doing very well, and confident in returning to work soon. Only pain reported is in R ischial tuberosity region. Pain: Initial:  
  4/10 Post Session:  3/10 THERAPEUTIC EXERCISE: (30 minutes):  Exercises per grid below to improve mobility and strength. Required minimal visual and verbal cues to promote proper body alignment, promote proper body posture and promote proper body mechanics. Progressed complexity of movement as indicated. Date: 
12-13-18 Date: 
12-17-18 Date: 
12-19-18 Date 12-20-18 Date 41-15-19 Date 
1-2-18 Activity/Exercise Parameters Parameters Parameters Stepper 10 minutes Level 1 10 minutes Level 1 10 minutes Level 1 10 minutes Level 1 10 minutes Level 1 Seated hamstring stretch 5x20 sec 5x30 sec 5x30 sec 5x30 sec Hamstring curls x10 Standing lumbar extension x20 Over // bar x20 Over // bar Lower trunk rotation x15 x15 Bridging  
     x10 x15 Side step Isometric hip flexion 3 sec hold 
x10 ea Supine Mini squat MANUAL THERAPY: (0 minutes): Joint mobilization and Soft tissue mobilization was utilized and necessary because of the patient's restricted joint motion, painful spasm and restricted motion of soft tissue. Patient received passive R hip rotator/piriformis stretching, 5x30 seconds, patient supine; patient received passive SLR, 5x30 seconds each; Patient received R hip long axis distraction, 10 second holds x 10 MODALITIES: (10 minutes):  Patient received IFC e-stim to R sided lower lumbar region, patient prone, H-wave with high frequency, intensity set to patient tolerance Treatment/Session Assessment:   
· Response to Treatment:  Patient is progressing very well and is pleased with her current state.  She states she believes she is physically ready for return to work, and recognizes that her first days back may increase her symptoms. · Compliance with Program/Exercises: Will assess as treatment progresses. · Recommendations/Intent for next treatment session: \"Next visit will focus on advancements to more challenging activities\". Total Treatment Duration: PT Patient Time In/Time Out Time In: 1330 Time Out: 8915 Jessy Bull PT, DPT Future Appointments Date Time Provider Elkin Villegasi 1/3/2019  1:00 PM Heather Rice PT, DPT [facility]  
1/4/2019 11:15 AM Connie Fishman MD [facility] [place]

## 2019-01-03 ENCOUNTER — HOSPITAL ENCOUNTER (OUTPATIENT)
Dept: PHYSICAL THERAPY | Age: 54
Discharge: HOME | End: 2019-01-03
Attending: FAMILY MEDICINE
Payer: COMMERCIAL

## 2019-01-03 PROCEDURE — 97110 THERAPEUTIC EXERCISES: CPT

## 2019-01-03 PROCEDURE — 97014 ELECTRIC STIMULATION THERAPY: CPT

## 2019-01-03 NOTE — THERAPY DISCHARGE
Nitin Garcia : 1965 Primary: [facility] Alaina Schmitt* Secondary:  [facility] [address] [place] [address], [address], [place] [address] [phone]   [phone] OUTPATIENT PHYSICAL THERAPY:Daily Note and Discharge Summary 1/3/2019 ICD-10: Treatment Diagnosis: Low back pain (M54.5); Right hip pain (M25.551) Precautions/Allergies: Motrin [ibuprofen] MD Orders: evaluate and treat MEDICAL/REFERRING DIAGNOSIS: 
Pain in right hip [M25.551] Muscle spasm of back [M62.830] DATE OF ONSET: Chronic REFERRING PHYSICIAN: Vicki Lara MD 
RETURN PHYSICIAN APPOINTMENT: 19 ASSESSMENT:  Ms. Onofre Paulino initiated PT on 18 and has attended 8 out of 8 PT sessions. She has progressed extremely well in therapy, and reports confidence in returning to work at this time. She initially scored a 0/80 on the Lower Extremity Functional Scale, and today scored an 80/80. She has met all long term goals and is very pleased with her experience in PT. She will be discharged from formal PT at this time. PROBLEM LIST (Impacting functional limitations): 1. Decreased Strength 2. Decreased ADL/Functional Activities 3. Decreased Ambulation Ability/Technique 4. Increased Pain 5. Decreased Activity Tolerance 6. Decreased Flexibility/Joint Mobility 7. Decreased Knowledge of Precautions 8. Decreased [place] with Home Exercise Program INTERVENTIONS PLANNED: (Treatment may consist of any combination of the following) 1. Cold 2. Cryotherapy 3. Electrical Stimulation 4. Gait Training 5. Heat 6. Home Exercise Program (HEP) 7. Manual Therapy 8. Neuromuscular Re-education/Strengthening 9. Range of Motion (ROM) 10. Therapeutic Activites 11. Therapeutic Exercise/Strengthening TREATMENT PLAN: 
Effective Dates: 2018 TO 1/3/2019 GOALS: (Goals have been discussed and agreed upon with patient.) ALL GOALS MET 1/3/2019 SHORT-TERM FUNCTIONAL GOALS: Time Frame: 4 weeks 1. Patient will be compliant with HEP focused on lower extremity strengthening and ROM. 2.  Patient will rate R LE pain no greater than 7/10 for improved tolerance to daily and work duties. DISCHARGE GOALS: Time Frame: 8 weeks 1. Patient will be independent with comprehensive HEP focused on continued performance of lower extremity strengthening and ROM activities. 2.  Patient will rate R LE pain no greater than 4/10 and which does not significantly interfere with daily or work duties for return to previous level of function. 3.  Patient will demonstrate near symmetrical bilateral LE AROM for optimum functional mobility with daily and work duties. 4.  Patient will demonstrate near symmetrical bilateral LE strength grades in the above tested planes for optimum performance and safety with daily and work duties. Rehabilitation Potential For Stated Goals: Good Regarding Leon Jefferson's therapy, I certify that the treatment plan above will be carried out by a therapist or under their direction. Thank you for this referral, Miguel Han, PT, DPT The information in this section was collected on 12-13-18 (except where otherwise noted). HISTORY:  
History of Present Injury/Illness (Reason for Referral): 
Patient reports several weeks ago, she fell and injured her R hip and back. She states the pain has slowly improved, but is still significantly impacting her walking and ADLs/work. She is currently out of work, and must submit for FMLA or return to work on 1-7-19. She reports medication has not improved her pain. Past Medical History/Comorbidities: Ms. Jerrell Shabazz  has a past medical history of Neurological disorder. Ms. Jerrell Shabazz  has a past surgical history that includes hx bartholin cyst marsupialization. Social History/Living Environment:  
  Independent Prior Level of Function/Work/Activity:  - [employer] - prolonged standing and twisting reported for work duties Personal Factors:   
      Sex:  female Age:  48 y.o. Other factors that influence how disability is experienced by the patient: LEFS functional score is 0/80, which seems exaggerated based on patient's presentation and observed abilities. Current Medications:   
  
Current Outpatient Medications:  
  tiZANidine (ZANAFLEX) 2 mg tablet, Take 1 Tab by mouth three (3) times daily as needed. , Disp: 30 Tab, Rfl: 1 
  traMADol (ULTRAM) 50 mg tablet, Take 1 Tab by mouth three (3) times daily as needed for Pain. Max Daily Amount: 150 mg., Disp: 30 Tab, Rfl: 1 
  diclofenac potassium (CATAFLAM) 50 mg tablet, Take 1 Tab by mouth three (3) times daily as needed. As needed for pain, Disp: 30 Tab, Rfl: 1 
  aspirin-acetaminophen-caffeine (EXCEDRIN ES) 250-250-65 mg per tablet, Take 1 Tab by mouth., Disp: , Rfl:  
  albuterol (PROVENTIL HFA, VENTOLIN HFA, PROAIR HFA) 90 mcg/actuation inhaler, Take 2 Puffs by inhalation every four (4) hours as needed for Wheezing (with spacer). , Disp: 1 Inhaler, Rfl: 0 Date Last Reviewed:  1/3/2019 Observation/Orthostatic Postural Assessment:   
      Patient rises very slowly and cautiously from seated position, with visible difficulty and pain. She pushes through UEs to stand and ambulates with decreased weight bearing through RLE, very slow gait speed and pronounced antalgic gait. Palpation:   
      Tenderness at R greater trochanter and general hip region and lower back ROM:   
      B/L LE AROM is WNL in all planes Strength:   
      R LE is WNL in all planes Body Structures Involved: 1. Nerves 2. Bones 3. Joints 4. Muscles Body Functions Affected: 1. Sensory/Pain 2. Neuromusculoskeletal 
3. Movement Related Activities and Participation Affected: 1. General Tasks and Demands 2. Mobility 3. Self Care 4. Domestic Life 5. Interpersonal Interactions and Relationships 6. Community, Social and [place] CLINICAL PRESENTATION:  
   
CLINICAL DECISION MAKING:  
Outcome Measure: Tool Used: Lower Extremity Functional Scale (LEFS) Score:  Initial: 0/80 Most Recent: 80/80 (Date: 1-3-18 ) Interpretation of Score: 20 questions each scored on a 5 point scale with 0 representing \"extreme difficulty or unable to perform\" and 4 representing \"no difficulty\". The lower the score, the greater the functional disability. 80/80 represents no disability. Minimal detectable change is 9 points. Score 80 79-63 62-48 47-32 31-16 15-1 0 Modifier CH CI CJ CK CL CM CN  
 
  
   
  
 
 
 
TREATMENT:  
(In addition to Assessment/Re-Assessment sessions the following treatments were rendered) Pre-treatment Symptoms/Complaints:  Patient reports she is doing very well, and confident in returning to work soon. Pain: Initial:  
  2/10 Post Session:  1-2/10 THERAPEUTIC EXERCISE: (30 minutes):  Exercises per grid below to improve mobility and strength. Required minimal visual and verbal cues to promote proper body alignment, promote proper body posture and promote proper body mechanics. Progressed complexity of movement as indicated. Date: 
12-13-18 Date: 
12-17-18 Date: 
12-19-18 Date 12-20-18 Date 13-45-61 Date 
1-3-18 Activity/Exercise Parameters Parameters Parameters Stepper 10 minutes Level 1 10 minutes Level 1 10 minutes Level 1 10 minutes Level 1 10 minutes Level 1 Seated hamstring stretch 5x20 sec 5x30 sec 5x30 sec 5x30 sec Hamstring curls x10 Standing lumbar extension x20 Over // bar x20 Over // bar Lower trunk rotation x15 x15 Bridging  
     x10 x15 Side step Isometric hip flexion 3 sec hold 
x10 ea Supine Mini squat Patient received passive R hip rotator/piriformis stretching, 5x30 seconds, patient supine; patient received passive SLR, 5x30 seconds each; Patient received R hip long axis distraction, 10 second holds x 10 MODALITIES: (10 minutes):  Patient received IFC e-stim to R sided lower lumbar region, patient prone, H-wave with high frequency, intensity set to patient tolerance Treatment/Session Assessment:   
· Response to Treatment:  Patient is progressing very well and is pleased with her current state. She states she believes she is physically ready for return to work, and recognizes that her first days back may increase her symptoms. Total Treatment Duration: PT Patient Time In/Time Out Time In: 1300 Time Out: 1204 Yudith Moreno PT, DPT Future Appointments Date Time Provider Elkin Almeida 1/4/2019 11:15 AM Divina Ku MD [facility]

## 2019-01-21 ENCOUNTER — HOSPITAL ENCOUNTER (OUTPATIENT)
Dept: MAMMOGRAPHY | Age: 54
Discharge: HOME | End: 2019-01-21
Payer: COMMERCIAL

## 2019-01-21 DIAGNOSIS — Z12.39 BREAST CANCER SCREENING: ICD-10-CM

## 2019-01-21 PROCEDURE — 77067 SCR MAMMO BI INCL CAD: CPT

## 2019-01-22 NOTE — PROGRESS NOTES
Dear Rupal Mata     Your recent mammogram showed :No mammographic evidence of malignancy.      Recommend annual mammogram in one year.  A reminder letter will be scheduled.     Thanks,  Dr. Brenton Ceballos

## 2019-09-23 ENCOUNTER — HOSPITAL ENCOUNTER (OUTPATIENT)
Dept: GENERAL RADIOLOGY | Age: 54
Discharge: HOME | End: 2019-09-23
Attending: FAMILY MEDICINE
Payer: COMMERCIAL

## 2019-09-23 DIAGNOSIS — G56.03 BILATERAL CARPAL TUNNEL SYNDROME: ICD-10-CM

## 2019-09-23 PROCEDURE — 72040 X-RAY EXAM NECK SPINE 2-3 VW: CPT

## 2019-09-23 NOTE — PROGRESS NOTES
Dear Ms. Pricilla Hill,    Your recent cervical ( neck) XR showed : FINDINGS:   The cervical vertebrae are normal in height and alignment. There is no prevertebral soft tissue swelling. IMPRESSION:   No acute findings in the cervical spine.      Thanks,  Dr. Dez Nieto

## 2019-09-27 PROBLEM — R76.8 RHEUMATOID FACTOR POSITIVE: Status: ACTIVE | Noted: 2019-09-27

## 2020-12-10 ENCOUNTER — TRANSCRIBE ORDER (OUTPATIENT)
Dept: SCHEDULING | Age: 55
End: 2020-12-10

## 2020-12-10 DIAGNOSIS — Z12.31 ENCOUNTER FOR SCREENING MAMMOGRAM FOR MALIGNANT NEOPLASM OF BREAST: Primary | ICD-10-CM

## 2021-01-02 ENCOUNTER — HOSPITAL ENCOUNTER (OUTPATIENT)
Dept: MAMMOGRAPHY | Age: 56
Discharge: HOME | End: 2021-01-02
Attending: FAMILY MEDICINE
Payer: COMMERCIAL

## 2021-01-02 DIAGNOSIS — Z12.31 ENCOUNTER FOR SCREENING MAMMOGRAM FOR MALIGNANT NEOPLASM OF BREAST: ICD-10-CM

## 2021-01-02 PROCEDURE — 77063 BREAST TOMOSYNTHESIS BI: CPT

## 2021-01-07 ENCOUNTER — TRANSCRIBE ORDER (OUTPATIENT)
Dept: SCHEDULING | Age: 56
End: 2021-01-07

## 2021-01-07 DIAGNOSIS — Z12.31 VISIT FOR SCREENING MAMMOGRAM: Primary | ICD-10-CM

## 2021-01-07 DIAGNOSIS — Z78.0 MENOPAUSE: Primary | ICD-10-CM

## 2022-03-02 ENCOUNTER — TRANSCRIBE ORDER (OUTPATIENT)
Dept: SCHEDULING | Age: 57
End: 2022-03-02

## 2022-03-02 DIAGNOSIS — Z12.31 VISIT FOR SCREENING MAMMOGRAM: Primary | ICD-10-CM

## 2022-03-19 PROBLEM — R76.8 RHEUMATOID FACTOR POSITIVE: Status: ACTIVE | Noted: 2019-09-27

## 2023-04-13 ENCOUNTER — HOSPITAL ENCOUNTER (OUTPATIENT)
Dept: MAMMOGRAPHY | Age: 58
Discharge: HOME | End: 2023-04-16
Payer: COMMERCIAL

## 2023-04-13 DIAGNOSIS — Z13.820 ENCOUNTER FOR OSTEOPOROSIS SCREENING IN ASYMPTOMATIC POSTMENOPAUSAL PATIENT: ICD-10-CM

## 2023-04-13 DIAGNOSIS — Z12.31 VISIT FOR SCREENING MAMMOGRAM: ICD-10-CM

## 2023-04-13 DIAGNOSIS — Z78.0 ENCOUNTER FOR OSTEOPOROSIS SCREENING IN ASYMPTOMATIC POSTMENOPAUSAL PATIENT: ICD-10-CM

## 2023-04-13 PROCEDURE — 77063 BREAST TOMOSYNTHESIS BI: CPT

## 2023-04-13 PROCEDURE — 77080 DXA BONE DENSITY AXIAL: CPT

## 2024-03-23 NOTE — PROGRESS NOTES
[facility] C[facility]IOLOG[facility]  [address] [place] [address], [facility] [address]  [place] [address]  PHONE: [phone]        NAME:  Jayla Coffey  : 1965  MRN: [id number]     PCP:  Zoya Mares MD    SUBJECTIVE:   Jayla Coffey is a 58 y.o. female seen for a consultation visit regarding the following:     Chief Complaint   Patient presents with    Consultation    Tachycardia        HPI:  Consultation is requested by Dr. Mares for evaluation of Consultation and Tachycardia     She presents establish new cardiac care with a history of exertional dyspnea, exertional chest pounding and chest tightness without radiation.  She notes mild increase in fatigue.  She denies any arrhythmic symptoms when resting or relaxing.  She has no prior cardiac history but is a prediabetic and has a potential curvilinear calcification at the renal hilum would raise suspicion for a 5 mm aneurysm by recent CT angiogram.  Symptoms have been present for the past several weeks.  She denies any volume overload, presyncope, or resting arrhythmic symptoms.  She never smoked.    Past Medical History, Past Surgical History, Family history, Social History, and Medications were all reviewed with the patient today and updated as necessary.     Current Outpatient Medications   Medication Sig Dispense Refill    dicyclomine (BENTYL) 10 MG capsule Take 1 capsule by mouth 4 times daily (before meals and nightly)      metFORMIN (GLUCOPHAGE-XR) 500 MG extended release tablet Take 1 tablet by mouth nightly      rosuvastatin (CRESTOR) 5 MG tablet Take 1 tablet by mouth daily 30 tablet 11    albuterol sulfate  (90 Base) MCG/ACT inhaler Inhale 2 puffs into the lungs every 4 hours as needed      aspirin-acetaminophen-caffeine (EXCEDRIN MIGRAINE) 250-250-65 MG per tablet Take 1 tablet by mouth      traMADol (ULTRAM) 50 MG tablet Take 1 tablet by mouth every 6 hours as needed.       No current facility-administered medications for this visit.

## 2024-03-25 ENCOUNTER — INITIAL CONSULT (OUTPATIENT)
Age: 59
End: 2024-03-25
Payer: COMMERCIAL

## 2024-03-25 VITALS — SYSTOLIC BLOOD PRESSURE: 126 MMHG | HEART RATE: 82 BPM | WEIGHT: 154 LBS | DIASTOLIC BLOOD PRESSURE: 80 MMHG

## 2024-03-25 DIAGNOSIS — R73.03 PREDIABETES: ICD-10-CM

## 2024-03-25 DIAGNOSIS — R06.09 DOE (DYSPNEA ON EXERTION): ICD-10-CM

## 2024-03-25 DIAGNOSIS — R06.01 ORTHOPNEA: ICD-10-CM

## 2024-03-25 DIAGNOSIS — R07.89 CHEST PRESSURE: ICD-10-CM

## 2024-03-25 DIAGNOSIS — R00.0 TACHYCARDIA: Primary | ICD-10-CM

## 2024-03-25 PROCEDURE — 93000 ELECTROCARDIOGRAM COMPLETE: CPT | Performed by: INTERNAL MEDICINE

## 2024-03-25 PROCEDURE — 99245 OFF/OP CONSLTJ NEW/EST HI 55: CPT | Performed by: INTERNAL MEDICINE

## 2024-03-25 RX ORDER — DICYCLOMINE HYDROCHLORIDE 10 MG/1
10 CAPSULE ORAL
COMMUNITY
Start: 2024-03-01

## 2024-03-25 RX ORDER — METFORMIN HYDROCHLORIDE 500 MG/1
500 TABLET, EXTENDED RELEASE ORAL NIGHTLY
COMMUNITY
Start: 2023-04-19

## 2024-03-25 RX ORDER — ROSUVASTATIN CALCIUM 5 MG/1
5 TABLET, COATED ORAL DAILY
Qty: 30 TABLET | Refills: 11 | Status: SHIPPED | OUTPATIENT
Start: 2024-03-25

## 2024-03-25 ASSESSMENT — ENCOUNTER SYMPTOMS
CHEST TIGHTNESS: 1
SHORTNESS OF BREATH: 1

## 2024-10-11 ENCOUNTER — APPOINTMENT (OUTPATIENT)
Dept: CT IMAGING | Age: 59
End: 2024-10-11
Payer: COMMERCIAL

## 2024-10-11 ENCOUNTER — HOSPITAL ENCOUNTER (EMERGENCY)
Age: 59
Discharge: HOME | End: 2024-10-11
Payer: COMMERCIAL

## 2024-10-11 VITALS
BODY MASS INDEX: 30.21 KG/M2 | DIASTOLIC BLOOD PRESSURE: 84 MMHG | RESPIRATION RATE: 16 BRPM | HEIGHT: 61 IN | OXYGEN SATURATION: 99 % | TEMPERATURE: 97.5 F | HEART RATE: 87 BPM | SYSTOLIC BLOOD PRESSURE: 181 MMHG | WEIGHT: 160 LBS

## 2024-10-11 DIAGNOSIS — R19.7 DIARRHEA, UNSPECIFIED TYPE: Primary | ICD-10-CM

## 2024-10-11 LAB
ALBUMIN SERPL-MCNC: 4.3 G/DL (ref 3.5–5)
ALBUMIN/GLOB SERPL: 1.2 (ref 1–1.9)
ALP SERPL-CCNC: 110 U/L (ref 35–104)
ALT SERPL-CCNC: 65 U/L (ref 8–45)
ANION GAP SERPL CALC-SCNC: 12 MMOL/L (ref 9–18)
AST SERPL-CCNC: 73 U/L (ref 15–37)
BACTERIA URNS QL MICRO: NEGATIVE /HPF
BASOPHILS # BLD: 0 K/UL (ref 0–0.2)
BASOPHILS NFR BLD: 1 % (ref 0–2)
BILIRUB SERPL-MCNC: 0.5 MG/DL (ref 0–1.2)
BILIRUB UR QL: NEGATIVE
BUN SERPL-MCNC: 15 MG/DL (ref 6–23)
CALCIUM SERPL-MCNC: 9.5 MG/DL (ref 8.8–10.2)
CHLORIDE SERPL-SCNC: 106 MMOL/L (ref 98–107)
CO2 SERPL-SCNC: 24 MMOL/L (ref 20–28)
CREAT SERPL-MCNC: 0.8 MG/DL (ref 0.6–1.1)
DIFFERENTIAL METHOD BLD: ABNORMAL
EOSINOPHIL # BLD: 0.7 K/UL (ref 0–0.8)
EOSINOPHIL NFR BLD: 11 % (ref 0.5–7.8)
EPI CELLS #/AREA URNS HPF: ABNORMAL /HPF
ERYTHROCYTE [DISTWIDTH] IN BLOOD BY AUTOMATED COUNT: 12.6 % (ref 11.9–14.6)
GLOBULIN SER CALC-MCNC: 3.6 G/DL (ref 2.3–3.5)
GLUCOSE SERPL-MCNC: 108 MG/DL (ref 70–99)
GLUCOSE UR QL STRIP.AUTO: NEGATIVE MG/DL
HCT VFR BLD AUTO: 43.9 % (ref 35.8–46.3)
HGB BLD-MCNC: 14.6 G/DL (ref 11.7–15.4)
HYALINE CASTS URNS QL MICRO: ABNORMAL /LPF
IMM GRANULOCYTES # BLD AUTO: 0 K/UL (ref 0–0.5)
IMM GRANULOCYTES NFR BLD AUTO: 0 % (ref 0–5)
KETONES UR-MCNC: NEGATIVE MG/DL
LEUKOCYTE ESTERASE UR QL STRIP: ABNORMAL
LIPASE SERPL-CCNC: 30 U/L (ref 13–60)
LYMPHOCYTES # BLD: 1 K/UL (ref 0.5–4.6)
LYMPHOCYTES NFR BLD: 16 % (ref 13–44)
MCH RBC QN AUTO: 29 PG (ref 26.1–32.9)
MCHC RBC AUTO-ENTMCNC: 33.3 G/DL (ref 31.4–35)
MCV RBC AUTO: 87.1 FL (ref 82–102)
MONOCYTES # BLD: 0.4 K/UL (ref 0.1–1.3)
MONOCYTES NFR BLD: 7 % (ref 4–12)
NEUTS SEG # BLD: 4.3 K/UL (ref 1.7–8.2)
NEUTS SEG NFR BLD: 66 % (ref 43–78)
NITRITE UR QL: NEGATIVE
NRBC # BLD: 0 K/UL (ref 0–0.2)
PH UR: 5.5 (ref 5–9)
PLATELET # BLD AUTO: 208 K/UL (ref 150–450)
PMV BLD AUTO: 11.5 FL (ref 9.4–12.3)
POTASSIUM SERPL-SCNC: 3.9 MMOL/L (ref 3.5–5.1)
PROT SERPL-MCNC: 7.9 G/DL (ref 6.3–8.2)
PROT UR QL: NEGATIVE MG/DL
RBC # BLD AUTO: 5.04 M/UL (ref 4.05–5.2)
RBC # UR STRIP: NEGATIVE
RBC #/AREA URNS HPF: ABNORMAL /HPF
SERVICE CMNT-IMP: ABNORMAL
SODIUM SERPL-SCNC: 142 MMOL/L (ref 136–145)
SP GR UR: 1.02 (ref 1–1.02)
UROBILINOGEN UR QL: 0.2 EU/DL (ref 0.2–1)
WBC # BLD AUTO: 6.6 K/UL (ref 4.3–11.1)
WBC URNS QL MICRO: ABNORMAL /HPF

## 2024-10-11 PROCEDURE — 99285 EMERGENCY DEPT VISIT HI MDM: CPT

## 2024-10-11 PROCEDURE — 6360000004 HC RX CONTRAST MEDICATION: Performed by: PHYSICIAN ASSISTANT

## 2024-10-11 PROCEDURE — 74177 CT ABD & PELVIS W/CONTRAST: CPT

## 2024-10-11 PROCEDURE — 6370000000 HC RX 637 (ALT 250 FOR IP): Performed by: PHYSICIAN ASSISTANT

## 2024-10-11 PROCEDURE — 83690 ASSAY OF LIPASE: CPT

## 2024-10-11 PROCEDURE — 81001 URINALYSIS AUTO W/SCOPE: CPT

## 2024-10-11 PROCEDURE — 85025 COMPLETE CBC W/AUTO DIFF WBC: CPT

## 2024-10-11 PROCEDURE — 80053 COMPREHEN METABOLIC PANEL: CPT

## 2024-10-11 RX ORDER — IOPAMIDOL 755 MG/ML
100 INJECTION, SOLUTION INTRAVASCULAR
Status: COMPLETED | OUTPATIENT
Start: 2024-10-11 | End: 2024-10-11

## 2024-10-11 RX ADMIN — IOPAMIDOL 100 ML: 755 INJECTION, SOLUTION INTRAVENOUS at 17:34

## 2024-10-11 RX ADMIN — HYOSCYAMINE SULFATE 0.12 MG: 0.12 TABLET SUBLINGUAL at 16:49

## 2024-10-11 ASSESSMENT — PAIN - FUNCTIONAL ASSESSMENT: PAIN_FUNCTIONAL_ASSESSMENT: NONE - DENIES PAIN

## 2024-10-11 ASSESSMENT — ENCOUNTER SYMPTOMS
BACK PAIN: 0
BLOOD IN STOOL: 0
ABDOMINAL PAIN: 1
SORE THROAT: 0
RHINORRHEA: 0
EYE REDNESS: 0
VOMITING: 0
SHORTNESS OF BREATH: 0
COUGH: 0
NAUSEA: 0
CHEST TIGHTNESS: 0
DIARRHEA: 1
ABDOMINAL DISTENTION: 0

## 2024-10-11 ASSESSMENT — LIFESTYLE VARIABLES
HOW OFTEN DO YOU HAVE A DRINK CONTAINING ALCOHOL: NEVER
HOW MANY STANDARD DRINKS CONTAINING ALCOHOL DO YOU HAVE ON A TYPICAL DAY: PATIENT DOES NOT DRINK

## 2024-10-11 NOTE — DISCHARGE INSTRUCTIONS
The CAT scan of your abdomen and pelvis did not show anything acutely worrisome today.  While your liver tests were slightly elevated they were nowhere near as high as they have been in the past.  Your other blood work looked unremarkable.  I am sending you home with Levsin to some medication that helps with abdominal cramping and diarrhea you can take it once every 4 hours as needed.  Recommend you stick to clear liquids and blander foods for the next 24 to 48 hours advance her diet slowly as tolerated.

## 2024-10-11 NOTE — ED TRIAGE NOTES
Pt presents with complaint of \"bacteria in her liver and stomach since January.\"  Pt states that she has been seeing GI who treated her for the bacteria and that it stopped two months ago but started back yesterday. Pt states that she does not know if the bacteria they treated was Cdiff.  Pt describes the stool as watery and dark in color.

## 2024-10-11 NOTE — ED PROVIDER NOTES
Emergency Department Provider Note       PCP: Zoya Mares MD   Age: 59 y.o.   Sex: female     DISPOSITION Decision To Discharge 10/11/2024 06:51:36 PM  Condition at Disposition: Data Unavailable       ICD-10-CM    1. Diarrhea, unspecified type  R19.7           Medical Decision Making     Patient is a 59-year-old female who presents with complaint of acute onset of diarrhea ongoing for the last 3 hours and right-sided abdominal pain and cramping.  She had similar episodes earlier this year after visiting [place] and was found to have elevated liver enzymes and eventually diagnosed with hepatitis A.  States that those symptoms resolved back in March and she has not had any further episodes or recurrences.  No recent antibiotic use.  No fever or chills.  No nausea or vomiting.  No recent travel outside the country.  She is afebrile, hypertensive otherwise vital signs within appropriate limits.  She has some tenderness to palpation along the right upper and lower abdomen however no guarding or rebound.  Will obtain labs including CBC CMP lipase urinalysis and will obtain CT imaging of the abdomen and pelvis.  She was given dose of Levsin here in the ED.  Labs Reviewed   CBC WITH AUTO DIFFERENTIAL - Abnormal; Notable for the following components:       Result Value    Eosinophils % 11 (*)     All other components within normal limits   COMPREHENSIVE METABOLIC PANEL - Abnormal; Notable for the following components:    Glucose 108 (*)     ALT 65 (*)     AST 73 (*)     Alk Phosphatase 110 (*)     Globulin 3.6 (*)     All other components within normal limits   URINALYSIS, MICRO - Abnormal; Notable for the following components:    WBC, UA 5-10 (*)     All other components within normal limits   POCT URINALYSIS DIPSTICK - Abnormal; Notable for the following components:    Leukocyte Est, UA POC TRACE (*)     All other components within normal limits   LIPASE     CT of the abdomen and pelvis does not show